# Patient Record
Sex: FEMALE | Race: WHITE | Employment: FULL TIME | URBAN - NONMETROPOLITAN AREA
[De-identification: names, ages, dates, MRNs, and addresses within clinical notes are randomized per-mention and may not be internally consistent; named-entity substitution may affect disease eponyms.]

---

## 2017-01-09 ENCOUNTER — OFFICE VISIT (OUTPATIENT)
Dept: SURGERY | Age: 42
End: 2017-01-09
Payer: COMMERCIAL

## 2017-01-09 VITALS
HEART RATE: 78 BPM | SYSTOLIC BLOOD PRESSURE: 100 MMHG | RESPIRATION RATE: 18 BRPM | HEIGHT: 67 IN | DIASTOLIC BLOOD PRESSURE: 68 MMHG | WEIGHT: 116.6 LBS | BODY MASS INDEX: 18.3 KG/M2

## 2017-01-09 DIAGNOSIS — R92.2 DENSE BREASTS: ICD-10-CM

## 2017-01-09 DIAGNOSIS — N60.19 DIFFUSE CYSTIC MASTOPATHY, UNSPECIFIED LATERALITY: ICD-10-CM

## 2017-01-09 DIAGNOSIS — N64.4 BREAST PAIN: Primary | ICD-10-CM

## 2017-01-09 PROCEDURE — 99214 OFFICE O/P EST MOD 30 MIN: CPT | Performed by: SURGERY

## 2017-01-09 RX ORDER — VENLAFAXINE HYDROCHLORIDE 75 MG/1
75 CAPSULE, EXTENDED RELEASE ORAL DAILY
Qty: 30 CAPSULE | Refills: 3 | Status: SHIPPED | OUTPATIENT
Start: 2017-01-09 | End: 2017-02-22

## 2017-01-24 ENCOUNTER — TELEPHONE (OUTPATIENT)
Dept: SURGERY | Age: 42
End: 2017-01-24

## 2017-02-20 ENCOUNTER — HOSPITAL ENCOUNTER (OUTPATIENT)
Dept: WOMENS IMAGING | Age: 42
Discharge: HOME | End: 2017-02-20
Payer: COMMERCIAL

## 2017-02-20 DIAGNOSIS — N64.4 BREAST PAIN: ICD-10-CM

## 2017-02-20 PROCEDURE — 76641 ULTRASOUND BREAST COMPLETE: CPT

## 2017-02-22 ENCOUNTER — OFFICE VISIT (OUTPATIENT)
Dept: SURGERY | Age: 42
End: 2017-02-22
Payer: COMMERCIAL

## 2017-02-22 VITALS
DIASTOLIC BLOOD PRESSURE: 60 MMHG | TEMPERATURE: 99.2 F | BODY MASS INDEX: 17.71 KG/M2 | SYSTOLIC BLOOD PRESSURE: 100 MMHG | WEIGHT: 112.8 LBS | HEIGHT: 67 IN

## 2017-02-22 DIAGNOSIS — N60.19 DIFFUSE CYSTIC MASTOPATHY, UNSPECIFIED LATERALITY: ICD-10-CM

## 2017-02-22 DIAGNOSIS — N64.4 BREAST PAIN: Primary | ICD-10-CM

## 2017-02-22 DIAGNOSIS — R92.2 DENSE BREASTS: ICD-10-CM

## 2017-02-22 PROCEDURE — 99214 OFFICE O/P EST MOD 30 MIN: CPT | Performed by: SURGERY

## 2017-02-22 RX ORDER — TAMOXIFEN CITRATE 20 MG/1
20 TABLET ORAL DAILY
Qty: 30 TABLET | Refills: 5 | Status: SHIPPED | OUTPATIENT
Start: 2017-02-22 | End: 2017-10-18 | Stop reason: ALTCHOICE

## 2017-02-22 RX ORDER — OSELTAMIVIR PHOSPHATE 75 MG/1
CAPSULE ORAL
COMMUNITY
Start: 2017-02-08 | End: 2017-10-18 | Stop reason: ALTCHOICE

## 2017-04-24 DIAGNOSIS — R92.2 DENSE BREASTS: ICD-10-CM

## 2017-04-24 DIAGNOSIS — N64.4 BREAST PAIN: ICD-10-CM

## 2017-04-24 DIAGNOSIS — N63.0 LUMP OR MASS IN BREAST: Primary | ICD-10-CM

## 2017-05-16 ENCOUNTER — HOSPITAL ENCOUNTER (OUTPATIENT)
Dept: WOMENS IMAGING | Age: 42
Discharge: HOME | End: 2017-05-16
Payer: COMMERCIAL

## 2017-05-16 DIAGNOSIS — N64.4 BREAST PAIN: ICD-10-CM

## 2017-05-16 DIAGNOSIS — R92.2 DENSE BREASTS: ICD-10-CM

## 2017-05-16 DIAGNOSIS — N63.0 LUMP OR MASS IN BREAST: ICD-10-CM

## 2017-05-16 PROCEDURE — 76642 ULTRASOUND BREAST LIMITED: CPT

## 2017-05-19 ENCOUNTER — OFFICE VISIT (OUTPATIENT)
Dept: SURGERY | Age: 42
End: 2017-05-19
Payer: COMMERCIAL

## 2017-05-19 VITALS
HEART RATE: 68 BPM | HEIGHT: 67 IN | WEIGHT: 111.6 LBS | DIASTOLIC BLOOD PRESSURE: 64 MMHG | SYSTOLIC BLOOD PRESSURE: 110 MMHG | BODY MASS INDEX: 17.52 KG/M2

## 2017-05-19 DIAGNOSIS — N60.19 DIFFUSE CYSTIC MASTOPATHY, UNSPECIFIED LATERALITY: ICD-10-CM

## 2017-05-19 DIAGNOSIS — R92.2 DENSE BREASTS: ICD-10-CM

## 2017-05-19 DIAGNOSIS — N64.4 BREAST PAIN: Primary | ICD-10-CM

## 2017-05-19 PROCEDURE — 99214 OFFICE O/P EST MOD 30 MIN: CPT | Performed by: SURGERY

## 2017-10-18 ENCOUNTER — OFFICE VISIT (OUTPATIENT)
Dept: SURGERY | Age: 42
End: 2017-10-18
Payer: COMMERCIAL

## 2017-10-18 VITALS
RESPIRATION RATE: 18 BRPM | DIASTOLIC BLOOD PRESSURE: 68 MMHG | SYSTOLIC BLOOD PRESSURE: 108 MMHG | HEIGHT: 67 IN | BODY MASS INDEX: 17.58 KG/M2 | WEIGHT: 112 LBS | HEART RATE: 84 BPM

## 2017-10-18 DIAGNOSIS — N64.4 BREAST PAIN: ICD-10-CM

## 2017-10-18 DIAGNOSIS — N60.19 FIBROCYSTIC BREAST DISEASE (FCBD), UNSPECIFIED LATERALITY: Primary | ICD-10-CM

## 2017-10-18 DIAGNOSIS — R92.2 DENSE BREASTS: ICD-10-CM

## 2017-10-18 PROCEDURE — G8419 CALC BMI OUT NRM PARAM NOF/U: HCPCS | Performed by: SURGERY

## 2017-10-18 PROCEDURE — G8484 FLU IMMUNIZE NO ADMIN: HCPCS | Performed by: SURGERY

## 2017-10-18 PROCEDURE — 99213 OFFICE O/P EST LOW 20 MIN: CPT | Performed by: SURGERY

## 2017-10-18 PROCEDURE — 4004F PT TOBACCO SCREEN RCVD TLK: CPT | Performed by: SURGERY

## 2017-10-18 PROCEDURE — G8427 DOCREV CUR MEDS BY ELIG CLIN: HCPCS | Performed by: SURGERY

## 2017-10-19 NOTE — PROGRESS NOTES
HISTORY OF PRESENT ILLNESS:      Ms. Samy Brito presents today for her follow up breast check. She is s/p benign bilateral breast biopsies on 07/15/16 were negative for malignancy. Her breast tenderness seems to improved minimally on the tamoxifen. She is having a few hot flashes and some irregular menses. She is ready to seriously discuss bilateral mastectomy and reconstruction. EXAMINATION:  On examination, she has fibrocystic changes throughout both breasts, no dominant masses, no skin or nipple changes and no axillary adenopathy. I see nothing suspicious for breast cancer. She does seem significantly less tenderness than on previous visits.      I have stressed the importance of self breast exam and have explained the technique to her. We also discussed the pathophysiology of fibrocystic disease and breast cancer. She expresses good understanding.      PLAN: Follow-up in November. She will work on stopping smoking. We will test for nicotine prior to scheduling her surgery. Given her breast size I suspect she would actually do best with direct to implant reconstruction. Absolutely she must stop smoking before we do anything. Over 50% of visit was spent counseling patient.   25 minutes of face to face time spent with patient.  Hussain Velazquez

## 2017-11-06 ENCOUNTER — TELEPHONE (OUTPATIENT)
Dept: SURGERY | Age: 42
End: 2017-11-06

## 2017-11-06 DIAGNOSIS — Z72.0 TOBACCO ABUSE: Primary | ICD-10-CM

## 2017-11-07 DIAGNOSIS — Z72.0 TOBACCO ABUSE: ICD-10-CM

## 2017-11-10 LAB
3-OH-COTININE: <2 NG/ML
COTININE: <2 NG/ML
NICOTINE: <2 NG/ML

## 2017-11-13 ENCOUNTER — HOSPITAL ENCOUNTER (OUTPATIENT)
Dept: PREADMISSION TESTING | Age: 42
Discharge: HOME | End: 2017-11-13
Payer: COMMERCIAL

## 2017-11-13 ENCOUNTER — OFFICE VISIT (OUTPATIENT)
Dept: SURGERY | Age: 42
End: 2017-11-13
Payer: COMMERCIAL

## 2017-11-13 ENCOUNTER — PREP FOR PROCEDURE (OUTPATIENT)
Dept: SURGERY | Age: 42
End: 2017-11-13

## 2017-11-13 VITALS
HEIGHT: 67 IN | DIASTOLIC BLOOD PRESSURE: 70 MMHG | WEIGHT: 112 LBS | HEART RATE: 64 BPM | SYSTOLIC BLOOD PRESSURE: 100 MMHG | BODY MASS INDEX: 17.58 KG/M2 | TEMPERATURE: 100 F

## 2017-11-13 VITALS — BODY MASS INDEX: 17.58 KG/M2 | WEIGHT: 112 LBS | HEIGHT: 67 IN

## 2017-11-13 DIAGNOSIS — N64.4 MASTODYNIA: Primary | ICD-10-CM

## 2017-11-13 LAB
BASOPHILS ABSOLUTE: 0 K/UL (ref 0–0.2)
BASOPHILS RELATIVE PERCENT: 0.3 % (ref 0–1)
EOSINOPHILS ABSOLUTE: 0 K/UL (ref 0–0.6)
EOSINOPHILS RELATIVE PERCENT: 0.5 % (ref 0–5)
HCT VFR BLD CALC: 40.5 % (ref 37–47)
HEMOGLOBIN: 13.6 G/DL (ref 12–16)
LYMPHOCYTES ABSOLUTE: 2.8 K/UL (ref 1.1–4.5)
LYMPHOCYTES RELATIVE PERCENT: 35.3 % (ref 20–40)
MCH RBC QN AUTO: 31.3 PG (ref 27–31)
MCHC RBC AUTO-ENTMCNC: 33.6 G/DL (ref 33–37)
MCV RBC AUTO: 93.3 FL (ref 81–99)
MONOCYTES ABSOLUTE: 0.6 K/UL (ref 0–0.9)
MONOCYTES RELATIVE PERCENT: 7.2 % (ref 0–10)
NEUTROPHILS ABSOLUTE: 4.4 K/UL (ref 1.5–7.5)
NEUTROPHILS RELATIVE PERCENT: 56.3 % (ref 50–65)
PDW BLD-RTO: 11.6 % (ref 11.5–14.5)
PLATELET # BLD: 250 K/UL (ref 130–400)
PMV BLD AUTO: 9.8 FL (ref 9.4–12.3)
RBC # BLD: 4.34 M/UL (ref 4.2–5.4)
WBC # BLD: 7.8 K/UL (ref 4.8–10.8)

## 2017-11-13 PROCEDURE — G8484 FLU IMMUNIZE NO ADMIN: HCPCS | Performed by: PHYSICIAN ASSISTANT

## 2017-11-13 PROCEDURE — G8419 CALC BMI OUT NRM PARAM NOF/U: HCPCS | Performed by: PHYSICIAN ASSISTANT

## 2017-11-13 PROCEDURE — G8427 DOCREV CUR MEDS BY ELIG CLIN: HCPCS | Performed by: PHYSICIAN ASSISTANT

## 2017-11-13 PROCEDURE — 99213 OFFICE O/P EST LOW 20 MIN: CPT | Performed by: PHYSICIAN ASSISTANT

## 2017-11-13 PROCEDURE — 87070 CULTURE OTHR SPECIMN AEROBIC: CPT

## 2017-11-13 PROCEDURE — 1036F TOBACCO NON-USER: CPT | Performed by: PHYSICIAN ASSISTANT

## 2017-11-13 PROCEDURE — 85025 COMPLETE CBC W/AUTO DIFF WBC: CPT

## 2017-11-13 RX ORDER — SODIUM CHLORIDE, SODIUM LACTATE, POTASSIUM CHLORIDE, CALCIUM CHLORIDE 600; 310; 30; 20 MG/100ML; MG/100ML; MG/100ML; MG/100ML
INJECTION, SOLUTION INTRAVENOUS CONTINUOUS
Status: CANCELLED | OUTPATIENT
Start: 2017-11-13

## 2017-11-13 RX ORDER — SODIUM CHLORIDE 0.9 % (FLUSH) 0.9 %
10 SYRINGE (ML) INJECTION PRN
Status: CANCELLED | OUTPATIENT
Start: 2017-11-13

## 2017-11-13 RX ORDER — CLINDAMYCIN PHOSPHATE 900 MG/50ML
900 INJECTION INTRAVENOUS
Status: CANCELLED | OUTPATIENT
Start: 2017-11-13 | End: 2017-11-13

## 2017-11-13 RX ORDER — SODIUM CHLORIDE 0.9 % (FLUSH) 0.9 %
10 SYRINGE (ML) INJECTION EVERY 12 HOURS SCHEDULED
Status: CANCELLED | OUTPATIENT
Start: 2017-11-13

## 2017-11-13 RX ORDER — BUPROPION HYDROCHLORIDE 150 MG/1
TABLET, EXTENDED RELEASE ORAL
Refills: 0 | COMMUNITY
Start: 2017-10-20 | End: 2017-11-13 | Stop reason: ALTCHOICE

## 2017-11-13 NOTE — PROGRESS NOTES
HISTORY OF PRESENT ILLNESS:  Cindy Beach is a 52-year-old patient of Dr. Henry Shone who presents with bilateral breast pain. She reports that the pain can be quite severe. She was seen by Dr. Henry Shone and placed on pharmacological treatment and also behavior modification techniques but has had persistent symptoms. She and Dr. Henry Shone have discussed bilateral mastectomies with immediate reconstructions. She is a smoker and has quit smoking recently. Her nicotine level was less than 2. I have discussed her care of Dr. Henry Shone and after discussion of the risks benefits and alternatives of surgery, she wishes to proceed with bilateral mastectomies with immediate reconstruction. Patient Active Problem List    Diagnosis Date Noted    Breast pain 05/06/2016    Dense breasts 05/06/2016    Lump or mass in breast 05/06/2016    Diffuse cystic mastopathy 05/06/2016     Current Outpatient Prescriptions   Medication Sig Dispense Refill    Ascorbic Acid (VITAMIN C) 500 MG tablet Take 500 mg by mouth daily      BL EVENING PRIMROSE OIL PO Take by mouth      Vitamin E 100 UNITS TABS Take by mouth      Flaxseed, Linseed, (FLAX SEEDS PO) Take by mouth      lidocaine (LIDODERM) 5 % Place 1 patch onto the skin every 24 hours 12 hours on, 12 hours off. 30 patch 5    acetaminophen (TYLENOL) 325 MG tablet Take 650 mg by mouth every 6 hours as needed for Pain      ibuprofen (ADVIL;MOTRIN) 200 MG tablet Take 800 mg by mouth every 6 hours as needed for Pain        No current facility-administered medications for this visit.       Allergies: Pcn [penicillins]     Past Medical History:   Diagnosis Date    Abnormal Pap smear of cervix     Dense breasts 5/6/2016    Irritable bowel syndrome      Past Surgical History:   Procedure Laterality Date    BLADDER REPAIR  stent    BREAST SURGERY  07/15/2016    Bilateral cyst aspiration & Biopsies    LEEP  1999    TUBAL LIGATION       Family History   Problem Relation Age of Onset    Stroke

## 2017-11-13 NOTE — LETTER
Preop Orders:     Patient:  Ferdinand Dewey  : 1975    Hospital:  [address] [facility]     Admitting Physician:  Lacey Lovell      Diagnosis:   Mastodynia    Procedure:   Bilateral mastectomy with immediate reconstruction    Anesthesia: General    Admission:Outpatient    Date:17    Labs:MRSA screen and per anesthesia      Pre-Op Meds:  Clindamycin 900 mg IV    Latex Allergy: no    Beta Blocker: no    Medication Instructions: No plavix for 2 weeks prior to procedure; no asprin for 3 days prior to procedure    This has been electronically signed by Yevgeniy Santos M.D.

## 2017-11-15 LAB — MRSA CULTURE ONLY: NORMAL

## 2017-11-16 ENCOUNTER — TELEPHONE (OUTPATIENT)
Dept: SURGERY | Age: 42
End: 2017-11-16

## 2017-11-21 ENCOUNTER — ANESTHESIA EVENT (OUTPATIENT)
Dept: OPERATING ROOM | Age: 42
End: 2017-11-21
Payer: COMMERCIAL

## 2017-11-21 ENCOUNTER — HOSPITAL ENCOUNTER (OUTPATIENT)
Age: 42
Setting detail: OBSERVATION
Discharge: HOME | End: 2017-11-22
Attending: SURGERY | Admitting: SURGERY
Payer: COMMERCIAL

## 2017-11-21 ENCOUNTER — ANESTHESIA (OUTPATIENT)
Dept: OPERATING ROOM | Age: 42
End: 2017-11-21
Payer: COMMERCIAL

## 2017-11-21 VITALS
RESPIRATION RATE: 1 BRPM | OXYGEN SATURATION: 99 % | DIASTOLIC BLOOD PRESSURE: 38 MMHG | SYSTOLIC BLOOD PRESSURE: 91 MMHG | TEMPERATURE: 98.8 F

## 2017-11-21 PROBLEM — Z90.13 S/P BILATERAL MASTECTOMY: Status: ACTIVE | Noted: 2017-11-21

## 2017-11-21 LAB — HCG(URINE) PREGNANCY TEST: NEGATIVE

## 2017-11-21 PROCEDURE — 15860 IV NJX TST VASC FLO FLAP/GRF: CPT | Performed by: SURGERY

## 2017-11-21 PROCEDURE — C9733 NON-OPHTHALMIC FVA: HCPCS | Performed by: SURGERY

## 2017-11-21 PROCEDURE — 2500000003 HC RX 250 WO HCPCS: Performed by: SURGERY

## 2017-11-21 PROCEDURE — 7100000001 HC PACU RECOVERY - ADDTL 15 MIN: Performed by: SURGERY

## 2017-11-21 PROCEDURE — G0378 HOSPITAL OBSERVATION PER HR: HCPCS

## 2017-11-21 PROCEDURE — 96365 THER/PROPH/DIAG IV INF INIT: CPT

## 2017-11-21 PROCEDURE — 81025 URINE PREGNANCY TEST: CPT

## 2017-11-21 PROCEDURE — 2720000001 HC MISC SURG SUPPLY STERILE $51-500: Performed by: SURGERY

## 2017-11-21 PROCEDURE — 3600000015 HC SURGERY LEVEL 5 ADDTL 15MIN: Performed by: SURGERY

## 2017-11-21 PROCEDURE — 6360000002 HC RX W HCPCS

## 2017-11-21 PROCEDURE — 88307 TISSUE EXAM BY PATHOLOGIST: CPT

## 2017-11-21 PROCEDURE — 19340 INSJ BREAST IMPLT SM D MAST: CPT | Performed by: SURGERY

## 2017-11-21 PROCEDURE — 6370000000 HC RX 637 (ALT 250 FOR IP): Performed by: SURGERY

## 2017-11-21 PROCEDURE — 2500000003 HC RX 250 WO HCPCS

## 2017-11-21 PROCEDURE — 6360000002 HC RX W HCPCS: Performed by: SURGERY

## 2017-11-21 PROCEDURE — 2580000003 HC RX 258: Performed by: SURGERY

## 2017-11-21 PROCEDURE — 3700000000 HC ANESTHESIA ATTENDED CARE: Performed by: SURGERY

## 2017-11-21 PROCEDURE — 19303 MAST SIMPLE COMPLETE: CPT | Performed by: SURGERY

## 2017-11-21 PROCEDURE — 2580000003 HC RX 258

## 2017-11-21 PROCEDURE — 19366 PR BREAST RECONSTRUC W OTHR TECHNIQ: CPT | Performed by: SURGERY

## 2017-11-21 PROCEDURE — 2580000003 HC RX 258: Performed by: PHYSICIAN ASSISTANT

## 2017-11-21 PROCEDURE — 15777 ACELLULAR DERM MATRIX IMPLT: CPT | Performed by: SURGERY

## 2017-11-21 PROCEDURE — 94664 DEMO&/EVAL PT USE INHALER: CPT

## 2017-11-21 PROCEDURE — 3600000005 HC SURGERY LEVEL 5 BASE: Performed by: SURGERY

## 2017-11-21 PROCEDURE — 2720000010 HC SURG SUPPLY STERILE: Performed by: SURGERY

## 2017-11-21 PROCEDURE — C1729 CATH, DRAINAGE: HCPCS | Performed by: SURGERY

## 2017-11-21 PROCEDURE — 2500000003 HC RX 250 WO HCPCS: Performed by: PHYSICIAN ASSISTANT

## 2017-11-21 PROCEDURE — A6403 STERILE GAUZE>16 <= 48 SQ IN: HCPCS | Performed by: SURGERY

## 2017-11-21 PROCEDURE — 3700000001 HC ADD 15 MINUTES (ANESTHESIA): Performed by: SURGERY

## 2017-11-21 PROCEDURE — 7100000000 HC PACU RECOVERY - FIRST 15 MIN: Performed by: SURGERY

## 2017-11-21 DEVICE — TISSUE ALLODERM SELECT 16X20CM: Type: IMPLANTABLE DEVICE | Site: BREAST | Status: FUNCTIONAL

## 2017-11-21 RX ORDER — SODIUM CHLORIDE 0.9 % (FLUSH) 0.9 %
10 SYRINGE (ML) INJECTION EVERY 12 HOURS SCHEDULED
Status: DISCONTINUED | OUTPATIENT
Start: 2017-11-21 | End: 2017-11-21 | Stop reason: HOSPADM

## 2017-11-21 RX ORDER — MEPERIDINE HYDROCHLORIDE 50 MG/ML
12.5 INJECTION INTRAMUSCULAR; INTRAVENOUS; SUBCUTANEOUS EVERY 5 MIN PRN
Status: DISCONTINUED | OUTPATIENT
Start: 2017-11-21 | End: 2017-11-21 | Stop reason: HOSPADM

## 2017-11-21 RX ORDER — ONDANSETRON 2 MG/ML
4 INJECTION INTRAMUSCULAR; INTRAVENOUS EVERY 6 HOURS PRN
Status: DISCONTINUED | OUTPATIENT
Start: 2017-11-21 | End: 2017-11-23 | Stop reason: HOSPADM

## 2017-11-21 RX ORDER — MIDAZOLAM HYDROCHLORIDE 1 MG/ML
2 INJECTION INTRAMUSCULAR; INTRAVENOUS
Status: DISCONTINUED | OUTPATIENT
Start: 2017-11-21 | End: 2017-11-21 | Stop reason: HOSPADM

## 2017-11-21 RX ORDER — GLYCOPYRROLATE 0.2 MG/ML
INJECTION INTRAMUSCULAR; INTRAVENOUS PRN
Status: DISCONTINUED | OUTPATIENT
Start: 2017-11-21 | End: 2017-11-21 | Stop reason: SDUPTHER

## 2017-11-21 RX ORDER — NALOXONE HYDROCHLORIDE 0.4 MG/ML
0.4 INJECTION, SOLUTION INTRAMUSCULAR; INTRAVENOUS; SUBCUTANEOUS PRN
Status: DISCONTINUED | OUTPATIENT
Start: 2017-11-21 | End: 2017-11-23 | Stop reason: HOSPADM

## 2017-11-21 RX ORDER — CLINDAMYCIN PHOSPHATE 900 MG/50ML
900 INJECTION INTRAVENOUS
Status: COMPLETED | OUTPATIENT
Start: 2017-11-21 | End: 2017-11-21

## 2017-11-21 RX ORDER — FENTANYL CITRATE 50 UG/ML
INJECTION, SOLUTION INTRAMUSCULAR; INTRAVENOUS PRN
Status: DISCONTINUED | OUTPATIENT
Start: 2017-11-21 | End: 2017-11-21 | Stop reason: SDUPTHER

## 2017-11-21 RX ORDER — EPHEDRINE SULFATE 50 MG/ML
INJECTION, SOLUTION INTRAVENOUS PRN
Status: DISCONTINUED | OUTPATIENT
Start: 2017-11-21 | End: 2017-11-21 | Stop reason: SDUPTHER

## 2017-11-21 RX ORDER — DIPHENHYDRAMINE HYDROCHLORIDE 50 MG/ML
12.5 INJECTION INTRAMUSCULAR; INTRAVENOUS
Status: DISCONTINUED | OUTPATIENT
Start: 2017-11-21 | End: 2017-11-21 | Stop reason: HOSPADM

## 2017-11-21 RX ORDER — HYDROCODONE BITARTRATE AND ACETAMINOPHEN 5; 325 MG/1; MG/1
2 TABLET ORAL EVERY 4 HOURS PRN
Status: DISCONTINUED | OUTPATIENT
Start: 2017-11-21 | End: 2017-11-23 | Stop reason: HOSPADM

## 2017-11-21 RX ORDER — HYDRALAZINE HYDROCHLORIDE 20 MG/ML
5 INJECTION INTRAMUSCULAR; INTRAVENOUS EVERY 10 MIN PRN
Status: DISCONTINUED | OUTPATIENT
Start: 2017-11-21 | End: 2017-11-21 | Stop reason: HOSPADM

## 2017-11-21 RX ORDER — MORPHINE SULFATE 10 MG/ML
4 INJECTION, SOLUTION INTRAMUSCULAR; INTRAVENOUS
Status: DISCONTINUED | OUTPATIENT
Start: 2017-11-21 | End: 2017-11-22

## 2017-11-21 RX ORDER — LIDOCAINE HYDROCHLORIDE 10 MG/ML
1 INJECTION, SOLUTION EPIDURAL; INFILTRATION; INTRACAUDAL; PERINEURAL
Status: DISCONTINUED | OUTPATIENT
Start: 2017-11-21 | End: 2017-11-21 | Stop reason: HOSPADM

## 2017-11-21 RX ORDER — LABETALOL HYDROCHLORIDE 5 MG/ML
5 INJECTION, SOLUTION INTRAVENOUS EVERY 10 MIN PRN
Status: DISCONTINUED | OUTPATIENT
Start: 2017-11-21 | End: 2017-11-21 | Stop reason: HOSPADM

## 2017-11-21 RX ORDER — DEXTROSE, SODIUM CHLORIDE, SODIUM LACTATE, POTASSIUM CHLORIDE, AND CALCIUM CHLORIDE 5; .6; .31; .03; .02 G/100ML; G/100ML; G/100ML; G/100ML; G/100ML
INJECTION, SOLUTION INTRAVENOUS CONTINUOUS
Status: DISCONTINUED | OUTPATIENT
Start: 2017-11-21 | End: 2017-11-23 | Stop reason: HOSPADM

## 2017-11-21 RX ORDER — SODIUM CHLORIDE 0.9 % (FLUSH) 0.9 %
10 SYRINGE (ML) INJECTION PRN
Status: DISCONTINUED | OUTPATIENT
Start: 2017-11-21 | End: 2017-11-21 | Stop reason: HOSPADM

## 2017-11-21 RX ORDER — ENALAPRILAT 2.5 MG/2ML
1.25 INJECTION INTRAVENOUS
Status: DISCONTINUED | OUTPATIENT
Start: 2017-11-21 | End: 2017-11-21 | Stop reason: HOSPADM

## 2017-11-21 RX ORDER — SODIUM CHLORIDE, SODIUM LACTATE, POTASSIUM CHLORIDE, CALCIUM CHLORIDE 600; 310; 30; 20 MG/100ML; MG/100ML; MG/100ML; MG/100ML
INJECTION, SOLUTION INTRAVENOUS CONTINUOUS
Status: DISCONTINUED | OUTPATIENT
Start: 2017-11-21 | End: 2017-11-21

## 2017-11-21 RX ORDER — PROPOFOL 10 MG/ML
INJECTION, EMULSION INTRAVENOUS PRN
Status: DISCONTINUED | OUTPATIENT
Start: 2017-11-21 | End: 2017-11-21 | Stop reason: SDUPTHER

## 2017-11-21 RX ORDER — SODIUM CHLORIDE 0.9 % (FLUSH) 0.9 %
10 SYRINGE (ML) INJECTION PRN
Status: DISCONTINUED | OUTPATIENT
Start: 2017-11-21 | End: 2017-11-23 | Stop reason: HOSPADM

## 2017-11-21 RX ORDER — FENTANYL CITRATE 50 UG/ML
50 INJECTION, SOLUTION INTRAMUSCULAR; INTRAVENOUS
Status: DISCONTINUED | OUTPATIENT
Start: 2017-11-21 | End: 2017-11-21 | Stop reason: HOSPADM

## 2017-11-21 RX ORDER — ROCURONIUM BROMIDE 10 MG/ML
INJECTION, SOLUTION INTRAVENOUS PRN
Status: DISCONTINUED | OUTPATIENT
Start: 2017-11-21 | End: 2017-11-21 | Stop reason: SDUPTHER

## 2017-11-21 RX ORDER — SUCCINYLCHOLINE CHLORIDE 20 MG/ML
INJECTION INTRAMUSCULAR; INTRAVENOUS PRN
Status: DISCONTINUED | OUTPATIENT
Start: 2017-11-21 | End: 2017-11-21 | Stop reason: SDUPTHER

## 2017-11-21 RX ORDER — CLINDAMYCIN PHOSPHATE 900 MG/50ML
900 INJECTION INTRAVENOUS EVERY 8 HOURS
Status: COMPLETED | OUTPATIENT
Start: 2017-11-22 | End: 2017-11-22

## 2017-11-21 RX ORDER — MORPHINE SULFATE 10 MG/ML
4 INJECTION, SOLUTION INTRAMUSCULAR; INTRAVENOUS EVERY 5 MIN PRN
Status: DISCONTINUED | OUTPATIENT
Start: 2017-11-21 | End: 2017-11-21 | Stop reason: HOSPADM

## 2017-11-21 RX ORDER — MORPHINE SULFATE 10 MG/ML
2 INJECTION, SOLUTION INTRAMUSCULAR; INTRAVENOUS EVERY 5 MIN PRN
Status: DISCONTINUED | OUTPATIENT
Start: 2017-11-21 | End: 2017-11-21 | Stop reason: HOSPADM

## 2017-11-21 RX ORDER — ONDANSETRON 2 MG/ML
INJECTION INTRAMUSCULAR; INTRAVENOUS PRN
Status: DISCONTINUED | OUTPATIENT
Start: 2017-11-21 | End: 2017-11-21 | Stop reason: SDUPTHER

## 2017-11-21 RX ORDER — MORPHINE SULFATE 1 MG/ML
4 INJECTION, SOLUTION EPIDURAL; INTRATHECAL; INTRAVENOUS
Status: DISCONTINUED | OUTPATIENT
Start: 2017-11-21 | End: 2017-11-21 | Stop reason: HOSPADM

## 2017-11-21 RX ORDER — HYDROMORPHONE HCL IN 0.9% NACL 10 MG/50ML
PATIENT CONTROLLED ANALGESIA SYRINGE INTRAVENOUS CONTINUOUS
Status: DISCONTINUED | OUTPATIENT
Start: 2017-11-21 | End: 2017-11-23 | Stop reason: HOSPADM

## 2017-11-21 RX ORDER — MIDAZOLAM HYDROCHLORIDE 1 MG/ML
INJECTION INTRAMUSCULAR; INTRAVENOUS PRN
Status: DISCONTINUED | OUTPATIENT
Start: 2017-11-21 | End: 2017-11-21 | Stop reason: SDUPTHER

## 2017-11-21 RX ORDER — SODIUM CHLORIDE, SODIUM GLUCONATE, SODIUM ACETATE, POTASSIUM CHLORIDE AND MAGNESIUM CHLORIDE 526; 502; 368; 37; 30 MG/100ML; MG/100ML; MG/100ML; MG/100ML; MG/100ML
INJECTION, SOLUTION INTRAVENOUS CONTINUOUS PRN
Status: DISCONTINUED | OUTPATIENT
Start: 2017-11-21 | End: 2017-11-21 | Stop reason: SDUPTHER

## 2017-11-21 RX ORDER — PROMETHAZINE HYDROCHLORIDE 25 MG/ML
6.25 INJECTION, SOLUTION INTRAMUSCULAR; INTRAVENOUS
Status: DISCONTINUED | OUTPATIENT
Start: 2017-11-21 | End: 2017-11-21 | Stop reason: HOSPADM

## 2017-11-21 RX ORDER — HYDROCODONE BITARTRATE AND ACETAMINOPHEN 5; 325 MG/1; MG/1
1 TABLET ORAL EVERY 4 HOURS PRN
Status: DISCONTINUED | OUTPATIENT
Start: 2017-11-21 | End: 2017-11-23 | Stop reason: HOSPADM

## 2017-11-21 RX ORDER — SODIUM CHLORIDE 0.9 % (FLUSH) 0.9 %
10 SYRINGE (ML) INJECTION EVERY 12 HOURS SCHEDULED
Status: DISCONTINUED | OUTPATIENT
Start: 2017-11-21 | End: 2017-11-23 | Stop reason: HOSPADM

## 2017-11-21 RX ORDER — LIDOCAINE HYDROCHLORIDE 10 MG/ML
1 INJECTION, SOLUTION EPIDURAL; INFILTRATION; INTRACAUDAL; PERINEURAL ONCE
Status: COMPLETED | OUTPATIENT
Start: 2017-11-21 | End: 2017-11-21

## 2017-11-21 RX ORDER — ACETAMINOPHEN 325 MG/1
650 TABLET ORAL EVERY 4 HOURS PRN
Status: DISCONTINUED | OUTPATIENT
Start: 2017-11-21 | End: 2017-11-23 | Stop reason: HOSPADM

## 2017-11-21 RX ORDER — SCOLOPAMINE TRANSDERMAL SYSTEM 1 MG/1
1 PATCH, EXTENDED RELEASE TRANSDERMAL ONCE
Status: DISCONTINUED | OUTPATIENT
Start: 2017-11-21 | End: 2017-11-21 | Stop reason: HOSPADM

## 2017-11-21 RX ORDER — LIDOCAINE HYDROCHLORIDE 10 MG/ML
INJECTION, SOLUTION EPIDURAL; INFILTRATION; INTRACAUDAL; PERINEURAL PRN
Status: DISCONTINUED | OUTPATIENT
Start: 2017-11-21 | End: 2017-11-21 | Stop reason: SDUPTHER

## 2017-11-21 RX ORDER — MORPHINE SULFATE 10 MG/ML
2 INJECTION, SOLUTION INTRAMUSCULAR; INTRAVENOUS
Status: DISCONTINUED | OUTPATIENT
Start: 2017-11-21 | End: 2017-11-22

## 2017-11-21 RX ORDER — DEXAMETHASONE SODIUM PHOSPHATE 10 MG/ML
INJECTION INTRAMUSCULAR; INTRAVENOUS PRN
Status: DISCONTINUED | OUTPATIENT
Start: 2017-11-21 | End: 2017-11-21 | Stop reason: SDUPTHER

## 2017-11-21 RX ORDER — METOCLOPRAMIDE HYDROCHLORIDE 5 MG/ML
10 INJECTION INTRAMUSCULAR; INTRAVENOUS
Status: DISCONTINUED | OUTPATIENT
Start: 2017-11-21 | End: 2017-11-21 | Stop reason: HOSPADM

## 2017-11-21 RX ADMIN — PROPOFOL 160 MG: 10 INJECTION, EMULSION INTRAVENOUS at 15:34

## 2017-11-21 RX ADMIN — CEFAZOLIN SODIUM 2 G: 2 SOLUTION INTRAVENOUS at 22:55

## 2017-11-21 RX ADMIN — SODIUM CHLORIDE, SODIUM LACTATE, POTASSIUM CHLORIDE, AND CALCIUM CHLORIDE: 600; 310; 30; 20 INJECTION, SOLUTION INTRAVENOUS at 11:56

## 2017-11-21 RX ADMIN — SODIUM CHLORIDE, SODIUM GLUCONATE, SODIUM ACETATE, POTASSIUM CHLORIDE AND MAGNESIUM CHLORIDE: 526; 502; 368; 37; 30 INJECTION, SOLUTION INTRAVENOUS at 19:44

## 2017-11-21 RX ADMIN — FENTANYL CITRATE 50 MCG: 50 INJECTION, SOLUTION INTRAMUSCULAR; INTRAVENOUS at 17:36

## 2017-11-21 RX ADMIN — DEXAMETHASONE SODIUM PHOSPHATE 10 MG: 10 INJECTION INTRAMUSCULAR; INTRAVENOUS at 15:40

## 2017-11-21 RX ADMIN — FENTANYL CITRATE 50 MCG: 50 INJECTION, SOLUTION INTRAMUSCULAR; INTRAVENOUS at 19:30

## 2017-11-21 RX ADMIN — MIDAZOLAM HYDROCHLORIDE 2 MG: 1 INJECTION, SOLUTION INTRAMUSCULAR; INTRAVENOUS at 15:20

## 2017-11-21 RX ADMIN — GLYCOPYRROLATE 0.2 MG: 0.2 INJECTION, SOLUTION INTRAMUSCULAR; INTRAVENOUS at 19:10

## 2017-11-21 RX ADMIN — HYDROMORPHONE HYDROCHLORIDE 0.5 MG: 1 INJECTION, SOLUTION INTRAMUSCULAR; INTRAVENOUS; SUBCUTANEOUS at 19:01

## 2017-11-21 RX ADMIN — HYDROMORPHONE HYDROCHLORIDE 0.5 MG: 1 INJECTION, SOLUTION INTRAMUSCULAR; INTRAVENOUS; SUBCUTANEOUS at 18:30

## 2017-11-21 RX ADMIN — HYDROMORPHONE HYDROCHLORIDE 0.5 MG: 1 INJECTION, SOLUTION INTRAMUSCULAR; INTRAVENOUS; SUBCUTANEOUS at 19:40

## 2017-11-21 RX ADMIN — ROCURONIUM BROMIDE 30 MG: 10 INJECTION INTRAVENOUS at 18:02

## 2017-11-21 RX ADMIN — FENTANYL CITRATE 50 MCG: 50 INJECTION, SOLUTION INTRAMUSCULAR; INTRAVENOUS at 15:57

## 2017-11-21 RX ADMIN — SUCCINYLCHOLINE CHLORIDE 140 MG: 20 INJECTION, SOLUTION INTRAMUSCULAR; INTRAVENOUS; PARENTERAL at 15:34

## 2017-11-21 RX ADMIN — ONDANSETRON HYDROCHLORIDE 4 MG: 2 SOLUTION INTRAMUSCULAR; INTRAVENOUS at 18:47

## 2017-11-21 RX ADMIN — CLINDAMYCIN PHOSPHATE 900 MG: 900 INJECTION INTRAVENOUS at 15:45

## 2017-11-21 RX ADMIN — Medication 10 MG: at 22:55

## 2017-11-21 RX ADMIN — ROCURONIUM BROMIDE 50 MG: 10 INJECTION INTRAVENOUS at 15:44

## 2017-11-21 RX ADMIN — ONDANSETRON 4 MG: 2 INJECTION INTRAMUSCULAR; INTRAVENOUS at 21:40

## 2017-11-21 RX ADMIN — LIDOCAINE HYDROCHLORIDE 50 MG: 10 INJECTION, SOLUTION EPIDURAL; INFILTRATION; INTRACAUDAL; PERINEURAL at 15:34

## 2017-11-21 RX ADMIN — NEOSTIGMINE METHYLSULFATE 3 MG: 1 INJECTION, SOLUTION INTRAMUSCULAR; INTRAVENOUS; SUBCUTANEOUS at 19:07

## 2017-11-21 RX ADMIN — ROCURONIUM BROMIDE 40 MG: 10 INJECTION INTRAVENOUS at 16:36

## 2017-11-21 RX ADMIN — EPHEDRINE SULFATE 10 MG: 50 INJECTION, SOLUTION INTRAMUSCULAR; INTRAVENOUS; SUBCUTANEOUS at 15:45

## 2017-11-21 RX ADMIN — SODIUM CHLORIDE, SODIUM LACTATE, POTASSIUM CHLORIDE, CALCIUM CHLORIDE AND DEXTROSE MONOHYDRATE: 5; 600; 310; 30; 20 INJECTION, SOLUTION INTRAVENOUS at 21:27

## 2017-11-21 RX ADMIN — SODIUM CHLORIDE, SODIUM LACTATE, POTASSIUM CHLORIDE, AND CALCIUM CHLORIDE: 600; 310; 30; 20 INJECTION, SOLUTION INTRAVENOUS at 15:40

## 2017-11-21 RX ADMIN — HYDROMORPHONE HYDROCHLORIDE 0.5 MG: 1 INJECTION, SOLUTION INTRAMUSCULAR; INTRAVENOUS; SUBCUTANEOUS at 20:24

## 2017-11-21 RX ADMIN — FENTANYL CITRATE 100 MCG: 50 INJECTION, SOLUTION INTRAMUSCULAR; INTRAVENOUS at 15:25

## 2017-11-21 RX ADMIN — LIDOCAINE HYDROCHLORIDE 1 ML: 10 INJECTION, SOLUTION EPIDURAL; INFILTRATION; INTRACAUDAL; PERINEURAL at 11:57

## 2017-11-21 ASSESSMENT — PAIN SCALES - GENERAL: PAINLEVEL_OUTOF10: 8

## 2017-11-21 ASSESSMENT — PAIN - FUNCTIONAL ASSESSMENT: PAIN_FUNCTIONAL_ASSESSMENT: 0-10

## 2017-11-21 ASSESSMENT — LIFESTYLE VARIABLES: SMOKING_STATUS: 0

## 2017-11-21 NOTE — ANESTHESIA PRE PROCEDURE
Department of Anesthesiology  Preprocedure Note       Name:  Geovany Farrell   Age:  40 y.o.  :  1975                                          MRN:  [id number]         Date:  2017      Surgeon: Alba Melara):  Carol Campos MD    Procedure: Procedure(s):  BREAST MASTECTOMY BILATERAL WITH IMMEDIATE RECONSTRUCTION    Medications prior to admission:   Prior to Admission medications    Medication Sig Start Date End Date Taking? Authorizing Provider   Ascorbic Acid (VITAMIN C) 500 MG tablet Take 500 mg by mouth daily   Yes Historical Provider, MD   BL EVENING PRIMROSE OIL PO Take 1 capsule by mouth daily    Yes Historical Provider, MD   Vitamin E 100 UNITS TABS Take by mouth   Yes Historical Provider, MD   lidocaine (LIDODERM) 5 % Place 1 patch onto the skin every 24 hours 12 hours on, 12 hours off. 16  Yes Carol Campos MD   acetaminophen (TYLENOL) 325 MG tablet Take 650 mg by mouth every 6 hours as needed for Pain   Yes Historical Provider, MD   ibuprofen (ADVIL;MOTRIN) 200 MG tablet Take 800 mg by mouth every 6 hours as needed for Pain    Yes Historical Provider, MD   Flaxseed, Linseed, (FLAX SEEDS PO) Take 1 capsule by mouth daily     Historical Provider, MD       Current medications:    Current Facility-Administered Medications   Medication Dose Route Frequency Provider Last Rate Last Dose    clindamycin (CLEOCIN) 900 mg in dextrose 5 % 50 mL IVPB  900 mg Intravenous On Call to 800 Sergey Waggoner PA-C        lactated ringers infusion   Intravenous Continuous Dayan Vicente PA-C 125 mL/hr at 17 1156      sodium chloride flush 0.9 % injection 10 mL  10 mL Intravenous 2 times per day Dayan Vicente PA-C        sodium chloride flush 0.9 % injection 10 mL  10 mL Intravenous PRN Dayan Vicente PA-C           Allergies:     Allergies   Allergen Reactions    Pcn [Penicillins] Rash     Childhood allergy       Problem List:    Patient Active Problem List   Diagnosis Code    Breast pain N64.4 Das Kevin in the last 72 hours. Coags: No results found for: PROTIME, INR, APTT    HCG (If Applicable):   Lab Results   Component Value Date    PREGTESTUR Negative 11/21/2017        ABGs: No results found for: PHART, PO2ART, USU9FGB, UAO5MSZ, BEART, Q8HJLBGB     Type & Screen (If Applicable):  No results found for: LABABO, [address]    Anesthesia Evaluation  Patient summary reviewed and Nursing notes reviewed no history of anesthetic complications:   Airway: Mallampati: II  TM distance: >3 FB   Neck ROM: full  Mouth opening: > = 3 FB Dental: normal exam         Pulmonary:normal exam        (-) not a current smoker          Patient did not smoke on day of surgery. ROS comment: Quit tob 3 days ago   Cardiovascular:          ECG reviewed               Beta Blocker:  Not on Beta Blocker         Neuro/Psych:   Negative Neuro/Psych ROS              GI/Hepatic/Renal:        (-) GERD       Endo/Other:        (-) hypothyroidism, no Type II DM               Abdominal:           Vascular: negative vascular ROS. Anesthesia Plan      general     ASA 2       Induction: intravenous. MIPS: Postoperative opioids intended and Prophylactic antiemetics administered. Anesthetic plan and risks discussed with patient. Plan discussed with CRNA.                   Franck Hollis MD   11/21/2017

## 2017-11-22 VITALS
DIASTOLIC BLOOD PRESSURE: 65 MMHG | HEART RATE: 81 BPM | SYSTOLIC BLOOD PRESSURE: 115 MMHG | BODY MASS INDEX: 17.58 KG/M2 | WEIGHT: 112 LBS | OXYGEN SATURATION: 100 % | TEMPERATURE: 98.4 F | RESPIRATION RATE: 16 BRPM | HEIGHT: 67 IN

## 2017-11-22 PROCEDURE — 96367 TX/PROPH/DG ADDL SEQ IV INF: CPT

## 2017-11-22 PROCEDURE — G0378 HOSPITAL OBSERVATION PER HR: HCPCS

## 2017-11-22 PROCEDURE — 2500000003 HC RX 250 WO HCPCS: Performed by: SURGERY

## 2017-11-22 PROCEDURE — 96366 THER/PROPH/DIAG IV INF ADDON: CPT

## 2017-11-22 PROCEDURE — 6370000000 HC RX 637 (ALT 250 FOR IP): Performed by: SURGERY

## 2017-11-22 PROCEDURE — 96376 TX/PRO/DX INJ SAME DRUG ADON: CPT

## 2017-11-22 PROCEDURE — 99024 POSTOP FOLLOW-UP VISIT: CPT | Performed by: SURGERY

## 2017-11-22 PROCEDURE — 6360000002 HC RX W HCPCS: Performed by: SURGERY

## 2017-11-22 RX ORDER — ONDANSETRON 4 MG/1
4 TABLET, FILM COATED ORAL EVERY 6 HOURS PRN
Qty: 10 TABLET | Refills: 2 | Status: SHIPPED | OUTPATIENT
Start: 2017-11-22 | End: 2021-10-21 | Stop reason: ALTCHOICE

## 2017-11-22 RX ORDER — SULFAMETHOXAZOLE AND TRIMETHOPRIM 800; 160 MG/1; MG/1
1 TABLET ORAL 2 TIMES DAILY
Qty: 20 TABLET | Refills: 1 | Status: SHIPPED | OUTPATIENT
Start: 2017-11-22 | End: 2017-11-25 | Stop reason: SINTOL

## 2017-11-22 RX ORDER — HYDROCODONE BITARTRATE AND ACETAMINOPHEN 5; 325 MG/1; MG/1
TABLET ORAL
Qty: 45 TABLET | Refills: 0 | Status: SHIPPED | OUTPATIENT
Start: 2017-11-22 | End: 2017-12-01 | Stop reason: SDUPTHER

## 2017-11-22 RX ORDER — MORPHINE SULFATE 1 MG/ML
2 INJECTION, SOLUTION EPIDURAL; INTRATHECAL; INTRAVENOUS
Status: DISCONTINUED | OUTPATIENT
Start: 2017-11-22 | End: 2017-11-23 | Stop reason: HOSPADM

## 2017-11-22 RX ORDER — MORPHINE SULFATE 1 MG/ML
4 INJECTION, SOLUTION EPIDURAL; INTRATHECAL; INTRAVENOUS
Status: DISCONTINUED | OUTPATIENT
Start: 2017-11-22 | End: 2017-11-23 | Stop reason: HOSPADM

## 2017-11-22 RX ADMIN — CLINDAMYCIN PHOSPHATE 900 MG: 18 INJECTION, SOLUTION INTRAVENOUS at 01:04

## 2017-11-22 RX ADMIN — HYDROCODONE BITARTRATE AND ACETAMINOPHEN 2 TABLET: 5; 325 TABLET ORAL at 19:26

## 2017-11-22 RX ADMIN — ONDANSETRON 4 MG: 2 INJECTION INTRAMUSCULAR; INTRAVENOUS at 14:34

## 2017-11-22 RX ADMIN — CLINDAMYCIN PHOSPHATE 900 MG: 18 INJECTION, SOLUTION INTRAVENOUS at 08:36

## 2017-11-22 RX ADMIN — ENOXAPARIN SODIUM 40 MG: 40 INJECTION SUBCUTANEOUS at 08:36

## 2017-11-22 RX ADMIN — MORPHINE SULFATE 2 MG: 10 INJECTION, SOLUTION INTRAMUSCULAR; INTRAVENOUS at 07:30

## 2017-11-22 RX ADMIN — CEFAZOLIN SODIUM 2 G: 2 SOLUTION INTRAVENOUS at 06:33

## 2017-11-22 RX ADMIN — ACETAMINOPHEN 650 MG: 325 TABLET ORAL at 14:31

## 2017-11-22 ASSESSMENT — PAIN SCALES - GENERAL
PAINLEVEL_OUTOF10: 5
PAINLEVEL_OUTOF10: 10
PAINLEVEL_OUTOF10: 4
PAINLEVEL_OUTOF10: 4
PAINLEVEL_OUTOF10: 3

## 2017-11-22 NOTE — ANESTHESIA POSTPROCEDURE EVALUATION
Department of Anesthesiology  Postprocedure Note    Patient: Viridiana Montes  MRN: [id number]  YOB: 1975  Date of evaluation: 11/21/2017  Time:  8:25 PM     Procedure Summary     Date:  11/21/17 Room / Location:  [facility] OR  / [facility] OR    Anesthesia Start:  1528 Anesthesia Stop:  2025    Procedure:  BREAST MASTECTOMY BILATERAL WITH IMMEDIATE RECONSTRUCTION (N/A Breast) Diagnosis:  (Mastodynia)    Surgeon:  Ama Ba MD Responsible Provider:  Maame Chi CRNA    Anesthesia Type:  general ASA Status:  2          Anesthesia Type: general    Lora Phase I:      Lora Phase II:      Last vitals: Reviewed and per EMR flowsheets.        Anesthesia Post Evaluation

## 2017-11-22 NOTE — OP NOTE
[facility] [address] [facility]                                 OPERATIVE REPORT    PATIENT NAME: Shelli Merinor                    :        1975  MED REC NO:   [id number]                              ROOM:       [facility]  ACCOUNT NO:   [de-identified]                           ADMIT DATE: 2017  PROVIDER:     Yevgeniy Santos MD      DATE OF PROCEDURE:  2017    PREOPERATIVE DIAGNOSIS:  Severe chronic mastodynia. POSTOPERATIVE DIAGNOSIS:  Severe chronic mastodynia. OPERATION PERFORMED:  1.  Bilateral nipple-sparing mastectomies. 2.  Bilateral direct implant reconstruction with 190 mL implants and  placement of acellular dermal matrix. 3.  Utilization of SPY Elite for assessment of tissue flap perfusion. SURGEON:  Yevgeniy Santos MD    ASSISTANT:  Dante Castellano PA-C, who was present for all portions of the  case including all critical portions as well as closure. ANESTHESIA:  General.    INDICATIONS:  The patient is a 78-year-old lady with 2-3 year history of  severe ongoing breast pain. We have tried a multitude of conservative  measures with minimal success and she would like to proceed with bilateral  mastectomy and reconstruction. We have been discussing this in some length  for over a year and we are now ready to proceed. We discussed the risks  and benefits at some length. She understands and is agreeable. OPERATIVE PROCEDURE:  Today, she was brought to the operating room,  underwent adequate general anesthesia, was prepped and draped in the  sterile fashion. We began on the right, made an inframammary incision and  dissected down to the muscle and then began taking the skin off the breast  parenchyma using facelift scissors, the PEAK PlasmaBlade, as well as the  LigaSure device.   Once we had completed this, we turned back to the  underside and from below, carefully dissected the breast off of the  pectoralis major muscle. Once this was accomplished, we removed the breast  to obtain good hemostasis, irrigated copiously. We marked the axilla and  the nipple, the axilla with a black suture and the nipple with a white  suture. We did take an extra sample of tissue from beneath the right  nipple. We then repeated the procedure in identical fashion on the left  side. Once we had done this, we irrigated copiously, obtained good  hemostasis, made sure everything was in good order. We then began our  reconstruction on the right side. We utilized a 16 x 20 piece of  perforated AlloDerm lot number [id number] and reference number [id number], we  kept the corners of the superior aspect and sutured in place with 2-0 PDS  suture. This was done in sort of a parachuting fashion. We then used a  running PDS around the medial aspect and lateral aspects. We measured our  breast size and it was 120 gm on the right and 115 on the left. We initially placed 140 mL sizers, which seemed to do quite well. We then  sutured the AlloDerm around the sizers all the way around and then did the  same process on the left, again parachuted the 16 x 20 AlloDerm Select  perforated mesh in place as well, sutured in place with 2-0 PDS. Once  again the 140 mL sizer, it appeared that this actually was going to be a  little small though as we _____ place it, so we then tried 190 mL sizer and  it seemed fit in there much more nicely. We then changed gowns and gloves,  re-draped around the incision and placed our implants. The implants were  highly cohesive anatomically shaped silicone gel implant 247 mL, serial  number [id number] the left breast and serial number [id number] the right  breast.  These were positioned appropriately and then the inferior aspect  of the AlloDerm was tacked into place, tucking the excess up under the  implants. We irrigated copiously with Betadine before we placed the final  implants.   We irrigated

## 2017-11-22 NOTE — BRIEF OP NOTE
Brief Postoperative Note      DATE OF PROCEDURE: 11/21/2017     SURGEON: Jossie Navarrete MD    PREOPERATIVE DIAGNOSIS:  Mastodynia    POSTOPERATIVE DIAGNOSIS: Same     OPERATION: Procedure(s):  BREAST MASTECTOMY BILATERAL WITH IMMEDIATE RECONSTRUCTION    ANESTHESIA: General    ESTIMATED BLOOD LOSS: Minimal    COMPLICATIONS: None. SPECIMENS:   ID Type Source Tests Collected by Time Destination   A : right nipple Tissue Breast SURGICAL PATHOLOGY Jossie Navarrete MD 11/21/2017 1608    B : left nipple Tissue Breast SURGICAL PATHOLOGY Jossie Navarrete MD 11/21/2017 1608    C : right breast tissue Tissue Breast SURGICAL PATHOLOGY Jossie Navarrete MD 11/21/2017 1621    D : left breast tissue Tissue Breast SURGICAL PATHOLOGY Jossie Navarrete MD 11/21/2017 1621        DRAINS: 4 CARLOS's    The patient tolerated the procedure well.     Electronically signed by Jossie Navarrete MD  on 11/21/2017 at 7:05 PM

## 2017-11-22 NOTE — PROGRESS NOTES
Byron Daniel M.D. FACS  Daily Progress Note    Pt Name: [id number] Wetzel Record Number: [id number]  Date of Birth 1975   Today's Date: 11/22/2017    Chief Complaint:  No chief complaint on file. SUBJECTIVE:     Marcelle Jimenez is doing well. Pain is well controlled. Wound vac didn't work very well.   We will reapply tegaderms    MEDS:     Scheduled Meds:   sodium chloride flush  10 mL Intravenous 2 times per day    enoxaparin  40 mg Subcutaneous Daily     Continuous Infusions:   dextrose 5% in lactated ringers 125 mL/hr at 11/21/17 2127    HYDROmorphone       PRN Meds:  sodium chloride flush 10 mL PRN   acetaminophen 650 mg Q4H PRN   HYDROcodone 5 mg - acetaminophen 1 tablet Q4H PRN   Or     HYDROcodone 5 mg - acetaminophen 2 tablet Q4H PRN   morphine 2 mg Q2H PRN   Or     morphine 4 mg Q2H PRN   ondansetron 4 mg Q6H PRN   naloxone 0.4 mg PRN       OBJECTIVE:     Patient Vitals for the past 24 hrs:   BP Temp Temp src Pulse Resp SpO2 Height Weight   11/22/17 1026 113/68 99 °F (37.2 °C) Temporal 69 16 97 % - -   11/22/17 0643 121/71 98.8 °F (37.1 °C) Temporal 66 16 99 % - -   11/22/17 0318 (!) 98/54 98 °F (36.7 °C) Temporal 84 16 98 % - -   11/21/17 2309 (!) 100/52 99.2 °F (37.3 °C) Temporal 89 16 98 % - -   11/21/17 2234 (!) 98/49 97.6 °F (36.4 °C) Temporal 83 14 96 % - -   11/21/17 2142 (!) 97/51 97.5 °F (36.4 °C) Temporal 76 14 92 % - -   11/21/17 2115 (!) 92/51 97.5 °F (36.4 °C) Temporal 87 14 92 % - -   11/21/17 2102 - - - 92 - - - -   11/21/17 2100 (!) 98/49 - - 90 14 93 % - -   11/21/17 2055 (!) 90/42 - - 103 13 94 % - -   11/21/17 2050 (!) 81/39 - - 83 11 94 % - -   11/21/17 2045 (!) 83/38 - - 84 12 93 % - -   11/21/17 2040 (!) 82/40 - - 78 11 94 % - -   11/21/17 2035 (!) 82/41 - - 83 13 95 % - -   11/21/17 2030 (!) 92/46 - - 72 11 98 % - -   11/21/17 2025 (!) 91/49 99 °F (37.2 °C) Temporal 71 10 99 % - -   11/21/17 1144 106/62 99.2 °F (37.3 °C) Tympanic 83 14 95 % 5' 7\" (1.702

## 2017-11-23 NOTE — PROGRESS NOTES
Patients IV dc'd from right hand and dsg applied.  PCA dc'd due to patient being discharged, 14ml used from syringe, 36ml wasted in omnicell but was wasted as Morphine instead of Dilaudid, waste was witnessed Electronically signed by Alma Merchant RN on 11/22/2017 at 10:03 PM

## 2017-11-25 RX ORDER — CLINDAMYCIN HYDROCHLORIDE 150 MG/1
150 CAPSULE ORAL 4 TIMES DAILY
Qty: 28 CAPSULE | Refills: 0 | Status: SHIPPED | OUTPATIENT
Start: 2017-11-25 | End: 2017-12-02

## 2017-11-28 ENCOUNTER — OFFICE VISIT (OUTPATIENT)
Dept: SURGERY | Age: 42
End: 2017-11-28

## 2017-11-28 VITALS — HEART RATE: 76 BPM | DIASTOLIC BLOOD PRESSURE: 80 MMHG | SYSTOLIC BLOOD PRESSURE: 110 MMHG

## 2017-11-28 DIAGNOSIS — Z90.13 S/P BILATERAL MASTECTOMY: Primary | ICD-10-CM

## 2017-11-28 PROCEDURE — 99024 POSTOP FOLLOW-UP VISIT: CPT | Performed by: PHYSICIAN ASSISTANT

## 2017-11-28 PROCEDURE — 1036F TOBACCO NON-USER: CPT | Performed by: PHYSICIAN ASSISTANT

## 2017-11-28 PROCEDURE — G8419 CALC BMI OUT NRM PARAM NOF/U: HCPCS | Performed by: PHYSICIAN ASSISTANT

## 2017-11-28 PROCEDURE — G8484 FLU IMMUNIZE NO ADMIN: HCPCS | Performed by: PHYSICIAN ASSISTANT

## 2017-11-28 PROCEDURE — G8427 DOCREV CUR MEDS BY ELIG CLIN: HCPCS | Performed by: PHYSICIAN ASSISTANT

## 2017-11-29 NOTE — PROGRESS NOTES
Katherine Ferrer comes in with concerns that her left superior drain is not draining. She has had issues with the left wound vac. Dr. Cyn Holloway saw her today too. We removed the superior drain bilaterally. We also removed the wound vacs. She will see Dr. Cny Holloway next week.

## 2017-12-01 ENCOUNTER — TELEPHONE (OUTPATIENT)
Dept: SURGERY | Age: 42
End: 2017-12-01

## 2017-12-01 RX ORDER — HYDROCODONE BITARTRATE AND ACETAMINOPHEN 5; 325 MG/1; MG/1
TABLET ORAL
Qty: 45 TABLET | Refills: 0 | Status: SHIPPED | OUTPATIENT
Start: 2017-12-01 | End: 2018-01-04 | Stop reason: ALTCHOICE

## 2017-12-01 NOTE — DISCHARGE SUMMARY
Physician Discharge Summary         Patient ID:  Chris Ramos  [id number]  3 y.o. Admit date: 11/21/2017    Discharge date and time: 11/22/2017 10:20 PM     Admitting Physician: Dante Teague MD     Admission Diagnoses: S/P bilateral mastectomy [Z90.13]    Discharge Diagnoses:   A.  Breast, right nipple biopsy: Benign breast parenchyma. Gino Guerin, left nipple biopsy: Benign breast parenchyma. C.  Breast, right skin and nipple sparing mastectomy:   1.  Changes consistent with fibrocystic mastopathy.   2.  Moderate chronic inflammation.   3.  Moderate ductal hyperplasia of the usual type.   4.  Negative for evidence of malignancy. D.  Breast, left skin and nipple sparing mastectomy:   1.  Changes consistent with fibrocystic mastopathy.   2.  Moderate chronic inflammation.   3.  Focal sclerosing adenosis.  4.  Focal moderate ductal hyperplasia of the usual type.   5.  Negative for evidence of malignancy. Patient Active Problem List   Diagnosis    Breast pain    Dense breasts    Lump or mass in breast    Diffuse cystic mastopathy    S/P bilateral mastectomy       Procedure:  BREAST MASTECTOMY BILATERAL WITH IMMEDIATE RECONSTRUCTION (N/A Breast)    Discharged Condition: good    Hospital Course:   Ms. Rowan Hoover is a 52-year-old female with a history of mastodynia. She has had minimal success with conservative treatment. Risks benefits and alternatives of surgery were discussed with her by Dr. Paco Storey. She underwent the above-stated surgery tolerated the procedure well. Day 1 postoperatively, her wound VAC was not working well, her Eddie-Mckeon drains were clear. She showed progressive improvement and was subsequently discharged to home. Consults: none    Disposition: home    Patient Instructions:    Activity: As per mastectomy instruction sheet  Diet: regular diet  Wound Care:  monitor CARLOS output and record totals daily  Follow-up with Dr Paco Storey in one week    Signed:  Electronically signed by

## 2017-12-04 ENCOUNTER — OFFICE VISIT (OUTPATIENT)
Dept: SURGERY | Age: 42
End: 2017-12-04

## 2017-12-04 VITALS
DIASTOLIC BLOOD PRESSURE: 74 MMHG | WEIGHT: 117 LBS | SYSTOLIC BLOOD PRESSURE: 122 MMHG | BODY MASS INDEX: 18.36 KG/M2 | TEMPERATURE: 98.7 F | HEART RATE: 72 BPM | RESPIRATION RATE: 16 BRPM | HEIGHT: 67 IN

## 2017-12-04 DIAGNOSIS — Z90.13 S/P BILATERAL MASTECTOMY: Primary | ICD-10-CM

## 2017-12-04 PROCEDURE — 99024 POSTOP FOLLOW-UP VISIT: CPT | Performed by: SURGERY

## 2017-12-11 ENCOUNTER — OFFICE VISIT (OUTPATIENT)
Dept: SURGERY | Age: 42
End: 2017-12-11

## 2017-12-11 ENCOUNTER — HOSPITAL ENCOUNTER (OUTPATIENT)
Dept: WOMENS IMAGING | Age: 42
Discharge: HOME | End: 2017-12-11
Payer: COMMERCIAL

## 2017-12-11 VITALS
WEIGHT: 116.2 LBS | BODY MASS INDEX: 18.24 KG/M2 | SYSTOLIC BLOOD PRESSURE: 120 MMHG | HEART RATE: 80 BPM | RESPIRATION RATE: 18 BRPM | HEIGHT: 67 IN | DIASTOLIC BLOOD PRESSURE: 70 MMHG

## 2017-12-11 DIAGNOSIS — Z90.13 S/P BILATERAL MASTECTOMY: ICD-10-CM

## 2017-12-11 DIAGNOSIS — N63.10 BREAST MASS, RIGHT: ICD-10-CM

## 2017-12-11 DIAGNOSIS — N63.20 MASS OF LEFT BREAST: Primary | ICD-10-CM

## 2017-12-11 DIAGNOSIS — N64.89 SEROMA OF BREAST: ICD-10-CM

## 2017-12-11 PROCEDURE — 99024 POSTOP FOLLOW-UP VISIT: CPT | Performed by: SURGERY

## 2017-12-11 PROCEDURE — 76642 ULTRASOUND BREAST LIMITED: CPT

## 2017-12-12 NOTE — PROGRESS NOTES
HISTORY OF PRESENT ILLNESS:  Ms. Samy Brito  is a 40 y.o.   female   who is status post bilateral mastectomy and prepectoral reconstruction on 11/21/17. Pathology showed no evidence of malignancy. Procedure was done for chronic ongoing breast pain. She had immediate reconstruction   We removed the drains last week without difficulty      PHYSICAL EXAM:  The  wounds look good with no evidence of infection, fluid accumulation, or skin necrosis. It is difficult to assess whether there is any fluid there are not. We took her over to ultrasound where ultrasound demonstrated no significant fluid at all on the left. There was tiny bit adjacent to the implant on the right but nothing would require drainage. IMPRESSION:    Doing well s/p  bilateral mastectomy and reconstruction    PLAN:  I will see her back in early January. She will call if anything changes. She is hoping to be able to drive same. She is anticipating being able to about 10 more days and hopefully she'll be able to return to work at that time. I spent over 50% of this visit counseling patient. 15 minutes of face to face time with patient. She is hoping to be able to travel to [facility] for a meeting in the next week or so. We will see her back next week to discuss this.

## 2017-12-18 ENCOUNTER — TELEPHONE (OUTPATIENT)
Dept: SURGERY | Age: 42
End: 2017-12-18

## 2017-12-18 NOTE — TELEPHONE ENCOUNTER
Pt not ready to return to work. Would like to stay off until after she sees Dr. Audie Wayne on 4th. Insurance forms printed and I will correct them and fax in.

## 2018-01-04 ENCOUNTER — OFFICE VISIT (OUTPATIENT)
Dept: SURGERY | Age: 43
End: 2018-01-04

## 2018-01-04 VITALS
HEART RATE: 74 BPM | BODY MASS INDEX: 18.11 KG/M2 | RESPIRATION RATE: 18 BRPM | SYSTOLIC BLOOD PRESSURE: 112 MMHG | WEIGHT: 115.4 LBS | HEIGHT: 67 IN | DIASTOLIC BLOOD PRESSURE: 68 MMHG

## 2018-01-04 DIAGNOSIS — Z90.13 S/P BILATERAL MASTECTOMY: Primary | ICD-10-CM

## 2018-01-04 PROCEDURE — 99024 POSTOP FOLLOW-UP VISIT: CPT | Performed by: SURGERY

## 2018-01-04 NOTE — LETTER
[address] [facility]  Phone: [phone]  Fax: Nita Georges MD        January 4, 2018     Patient: Kareem Wing   YOB: 1975   Date of Visit: 1/4/2018       To Whom It May Concern: It is my medical opinion that Dorothy Strong be returned to full duty work, without restriction, beginning Friday, 1/5/18. Thank you. If you have any questions or concerns, please don't hesitate to call.     Sincerely,      Electronically Signed:    Nadja Pozo MD

## 2018-04-04 ENCOUNTER — OFFICE VISIT (OUTPATIENT)
Dept: SURGERY | Age: 43
End: 2018-04-04
Payer: COMMERCIAL

## 2018-04-04 VITALS
DIASTOLIC BLOOD PRESSURE: 68 MMHG | BODY MASS INDEX: 18.36 KG/M2 | HEIGHT: 67 IN | WEIGHT: 117 LBS | HEART RATE: 80 BPM | SYSTOLIC BLOOD PRESSURE: 114 MMHG

## 2018-04-04 DIAGNOSIS — Z90.13 S/P BILATERAL MASTECTOMY: Primary | ICD-10-CM

## 2018-04-04 PROCEDURE — G8419 CALC BMI OUT NRM PARAM NOF/U: HCPCS | Performed by: SURGERY

## 2018-04-04 PROCEDURE — 99213 OFFICE O/P EST LOW 20 MIN: CPT | Performed by: SURGERY

## 2018-04-04 PROCEDURE — 1036F TOBACCO NON-USER: CPT | Performed by: SURGERY

## 2018-04-04 PROCEDURE — G8428 CUR MEDS NOT DOCUMENT: HCPCS | Performed by: SURGERY

## 2018-09-09 ENCOUNTER — OFFICE VISIT (OUTPATIENT)
Dept: URGENT CARE | Age: 43
End: 2018-09-09
Payer: COMMERCIAL

## 2018-09-09 VITALS
WEIGHT: 108 LBS | TEMPERATURE: 97.6 F | RESPIRATION RATE: 16 BRPM | BODY MASS INDEX: 16.95 KG/M2 | HEART RATE: 77 BPM | HEIGHT: 67 IN | DIASTOLIC BLOOD PRESSURE: 63 MMHG | SYSTOLIC BLOOD PRESSURE: 103 MMHG | OXYGEN SATURATION: 98 %

## 2018-09-09 DIAGNOSIS — J01.41 ACUTE RECURRENT PANSINUSITIS: Primary | ICD-10-CM

## 2018-09-09 PROCEDURE — G8427 DOCREV CUR MEDS BY ELIG CLIN: HCPCS | Performed by: FAMILY MEDICINE

## 2018-09-09 PROCEDURE — 99213 OFFICE O/P EST LOW 20 MIN: CPT | Performed by: FAMILY MEDICINE

## 2018-09-09 PROCEDURE — 1036F TOBACCO NON-USER: CPT | Performed by: FAMILY MEDICINE

## 2018-09-09 PROCEDURE — G8419 CALC BMI OUT NRM PARAM NOF/U: HCPCS | Performed by: FAMILY MEDICINE

## 2018-09-09 RX ORDER — DOXYCYCLINE 100 MG/1
100 CAPSULE ORAL 2 TIMES DAILY
Qty: 20 CAPSULE | Refills: 0 | Status: SHIPPED | OUTPATIENT
Start: 2018-09-09 | End: 2021-10-21 | Stop reason: ALTCHOICE

## 2018-09-09 NOTE — PATIENT INSTRUCTIONS
hot, wet towel or a warm gel pack on your face 3 or 4 times a day for 5 to 10 minutes each time. · Try a decongestant nasal spray like oxymetazoline (Afrin). Do not use it for more than 3 days in a row. Using it for more than 3 days can make your congestion worse. When should you call for help? Call your doctor now or seek immediate medical care if:    · You have new or worse swelling or redness in your face or around your eyes.     · You have a new or higher fever.    Watch closely for changes in your health, and be sure to contact your doctor if:    · You have new or worse facial pain.     · The mucus from your nose becomes thicker (like pus) or has new blood in it.     · You are not getting better as expected. Where can you learn more? Go to [email or web address]://[email or web address] [email or web address]. org and sign in to your [employer] account. Enter A900 in the mParticle box to learn more about \"Sinusitis: Care Instructions. \"     If you do not have an account, please click on the \"Sign Up Now\" link. Current as of: May 12, 2017  Content Version: 11.7  © 1898-6182 [employer], [employer]. Care instructions adapted under license by [facility] ([facility]). If you have questions about a medical condition or this instruction, always ask your healthcare professional. [address] any warranty or liability for your use of this information.

## 2018-09-09 NOTE — PROGRESS NOTES
Merissa Crowley is a 40 y.o. female    Chief Complaint   Patient presents with    Nasal Congestion    Cough    Chills       HPI    Review of Systems    Prior to Admission medications    Medication Sig Start Date End Date Taking? Authorizing Provider   doxycycline monohydrate (MONODOX) 100 MG capsule Take 1 capsule by mouth 2 times daily 9/9/18  Yes Komal Stuart MD   acetaminophen (TYLENOL) 325 MG tablet Take 650 mg by mouth every 6 hours as needed for Pain   Yes Historical Provider, MD   ondansetron (ZOFRAN) 4 MG tablet Take 1 tablet by mouth every 6 hours as needed for Nausea or Vomiting 11/22/17   Ulysses Palomino MD   Ascorbic Acid (VITAMIN C) 500 MG tablet Take 500 mg by mouth daily    Historical Provider, MD   BL EVENING PRIMROSE OIL PO Take 1 capsule by mouth daily     Historical Provider, MD   Vitamin E 100 UNITS TABS Take by mouth    Historical Provider, MD   Flaxseed, Linseed, (FLAX SEEDS PO) Take 1 capsule by mouth daily     Historical Provider, MD   lidocaine (LIDODERM) 5 % Place 1 patch onto the skin every 24 hours 12 hours on, 12 hours off. 8/1/16   Ulysses Palomino MD   ibuprofen (ADVIL;MOTRIN) 200 MG tablet Take 800 mg by mouth every 6 hours as needed for Pain     Historical Provider, MD       Past Medical History:   Diagnosis Date    Abnormal Pap smear of cervix     Dense breasts 05/06/2016    Irritable bowel syndrome        Past Surgical History:   Procedure Laterality Date    BLADDER REPAIR  stent    6 YR.  OLD    BREAST SURGERY  07/15/2016    Bilateral cyst aspiration & Biopsies; LOCAL/ORAL SEDATION    LEEP  1999    MASTECTOMY N/A 11/21/2017    BREAST MASTECTOMY BILATERAL WITH IMMEDIATE RECONSTRUCTION performed by Ulysses Palomino MD at [address] [place] [address]  2004       Social History     Social History    Marital status:      Spouse name: N/A    Number of children: N/A    Years of education: N/A     Social History Main Topics    Smoking status: Former Smoker more than 3 days in a row. Using it for more than 3 days can make your congestion worse. When should you call for help? Call your doctor now or seek immediate medical care if:    · You have new or worse swelling or redness in your face or around your eyes.     · You have a new or higher fever.    Watch closely for changes in your health, and be sure to contact your doctor if:    · You have new or worse facial pain.     · The mucus from your nose becomes thicker (like pus) or has new blood in it.     · You are not getting better as expected. Where can you learn more? Go to https://[employer].Arkmicro. org and sign in to your [employer] account. Enter Q376 in the Natero box to learn more about \"Sinusitis: Care Instructions. \"     If you do not have an account, please click on the \"Sign Up Now\" link. Current as of: May 12, 2017  Content Version: 11.7  © 1005-3098 [employer]. Care instructions adapted under license by [facility] ([facility] and the [facility]). If you have questions about a medical condition or this instruction, always ask your healthcare professional. [place] [address] any warranty or liability for your use of this information.

## 2018-10-24 ENCOUNTER — TELEPHONE (OUTPATIENT)
Dept: SURGERY | Age: 43
End: 2018-10-24

## 2018-10-24 ENCOUNTER — OFFICE VISIT (OUTPATIENT)
Dept: SURGERY | Age: 43
End: 2018-10-24
Payer: COMMERCIAL

## 2018-10-24 VITALS — SYSTOLIC BLOOD PRESSURE: 100 MMHG | DIASTOLIC BLOOD PRESSURE: 60 MMHG | HEART RATE: 72 BPM

## 2018-10-24 DIAGNOSIS — Z90.13 S/P BILATERAL MASTECTOMY: Primary | ICD-10-CM

## 2018-10-24 PROCEDURE — G8484 FLU IMMUNIZE NO ADMIN: HCPCS | Performed by: SURGERY

## 2018-10-24 PROCEDURE — 1036F TOBACCO NON-USER: CPT | Performed by: SURGERY

## 2018-10-24 PROCEDURE — G8419 CALC BMI OUT NRM PARAM NOF/U: HCPCS | Performed by: SURGERY

## 2018-10-24 PROCEDURE — 99213 OFFICE O/P EST LOW 20 MIN: CPT | Performed by: SURGERY

## 2018-10-24 PROCEDURE — G8427 DOCREV CUR MEDS BY ELIG CLIN: HCPCS | Performed by: SURGERY

## 2018-11-01 ENCOUNTER — HOSPITAL ENCOUNTER (OUTPATIENT)
Age: 43
Setting detail: SPECIMEN
Discharge: HOME | End: 2018-11-01
Payer: COMMERCIAL

## 2018-11-01 ENCOUNTER — PROCEDURE VISIT (OUTPATIENT)
Dept: SURGERY | Age: 43
End: 2018-11-01
Payer: COMMERCIAL

## 2018-11-01 VITALS — WEIGHT: 111 LBS | HEIGHT: 67 IN | BODY MASS INDEX: 17.42 KG/M2

## 2018-11-01 DIAGNOSIS — R22.9 SKIN MASS: Primary | ICD-10-CM

## 2018-11-01 PROCEDURE — 88307 TISSUE EXAM BY PATHOLOGIST: CPT

## 2018-11-01 PROCEDURE — 12031 INTMD RPR S/A/T/EXT 2.5 CM/<: CPT | Performed by: PHYSICIAN ASSISTANT

## 2018-11-01 PROCEDURE — 11401 EXC TR-EXT B9+MARG 0.6-1 CM: CPT | Performed by: PHYSICIAN ASSISTANT

## 2018-11-09 ENCOUNTER — TELEPHONE (OUTPATIENT)
Dept: SURGERY | Age: 43
End: 2018-11-09

## 2019-03-11 ENCOUNTER — OFFICE VISIT (OUTPATIENT)
Dept: OBSTETRICS AND GYNECOLOGY | Facility: CLINIC | Age: 44
End: 2019-03-11

## 2019-03-11 VITALS
SYSTOLIC BLOOD PRESSURE: 90 MMHG | WEIGHT: 109 LBS | BODY MASS INDEX: 17.11 KG/M2 | HEIGHT: 67 IN | DIASTOLIC BLOOD PRESSURE: 58 MMHG

## 2019-03-11 DIAGNOSIS — Z01.419 ENCOUNTER FOR GYNECOLOGICAL EXAMINATION WITHOUT ABNORMAL FINDING: Primary | ICD-10-CM

## 2019-03-11 DIAGNOSIS — Z12.4 SCREENING FOR CERVICAL CANCER: ICD-10-CM

## 2019-03-11 PROCEDURE — G0123 SCREEN CERV/VAG THIN LAYER: HCPCS | Performed by: OBSTETRICS & GYNECOLOGY

## 2019-03-11 PROCEDURE — 87624 HPV HI-RISK TYP POOLED RSLT: CPT | Performed by: OBSTETRICS & GYNECOLOGY

## 2019-03-11 PROCEDURE — 99386 PREV VISIT NEW AGE 40-64: CPT | Performed by: OBSTETRICS & GYNECOLOGY

## 2019-03-11 RX ORDER — ASCORBIC ACID 500 MG
500 TABLET ORAL
COMMUNITY

## 2019-03-11 RX ORDER — IBUPROFEN 200 MG
800 TABLET ORAL
COMMUNITY

## 2019-03-11 NOTE — PROGRESS NOTES
"Subjective   Angelina Rodriguez is a 43 y.o. female.     CC: annual exam    History of Present Illness   SUBJECTIVE: Angelina Rodriguez is a 43 y.o. female , para 3, who comes to the office today for annual GYN examination. Last menstrual period was 1 week ago and her last Pap smear was 2016, and was normal by her report. She had a LEEP for cervical dysplasia in 1999. She had a tubal ligation for contraception and has no complaints about her menstrual cycles. Her medical history is reviewed. She had a prophylactic bilateral mastectomy for fibrocystic breasts and family history.    Social History     Tobacco Use   • Smoking status: Current Every Day Smoker     Packs/day: 0.50     Types: Cigarettes   • Smokeless tobacco: Never Used   Substance Use Topics   • Alcohol use: Yes   • Drug use: No     Review of Systems   Constitutional: Negative for unexpected weight loss.   Gastrointestinal: Negative for abdominal pain.   Genitourinary: Negative for menstrual problem.   Hematological: Does not bruise/bleed easily.       Visit Vitals  BP 90/58 (BP Location: Left arm, Patient Position: Sitting)   Ht 170.2 cm (67\")   Wt 49.4 kg (109 lb)   LMP 03/07/2019 (Exact Date)   BMI 17.07 kg/m²      Objective   Physical Exam   Constitutional: She is oriented to person, place, and time. She appears well-developed and well-nourished. No distress.   HENT:   Head: Normocephalic and atraumatic.   Eyes: EOM are normal.   Neck: Normal range of motion. Neck supple.   Cardiovascular: Normal rate and regular rhythm.   No murmur heard.  Pulmonary/Chest: Effort normal and breath sounds normal.   Abdominal: Soft. She exhibits no distension. There is no tenderness.   Genitourinary: Vagina normal and uterus normal. Pelvic exam was performed with patient supine. There is no tenderness or lesion on the right labia. There is no tenderness or lesion on the left labia. Cervix does not exhibit motion tenderness, discharge or friability. Right adnexum displays no " tenderness and no fullness. Left adnexum displays no tenderness and no fullness. No tenderness or bleeding in the vagina. No vaginal discharge found.   Genitourinary Comments: A pap smear was performed   Musculoskeletal: Normal range of motion. She exhibits no edema.   Neurological: She is alert and oriented to person, place, and time.   Skin: Skin is warm and dry.   Psychiatric: She has a normal mood and affect. Her behavior is normal. Judgment normal.   Nursing note and vitals reviewed.    Assessment/Plan   Angelina was seen today for gynecologic exam.    Diagnoses and all orders for this visit:    Encounter for gynecological examination without abnormal finding    Screening for cervical cancer  -     Liquid-based Pap Smear, Screening        We will notify her when the Pap smear results are available.  She will return in one year. In the meantime if she develops questions or problems, she will notify the office.

## 2019-03-13 LAB
GEN CATEG CVX/VAG CYTO-IMP: NORMAL
HPV I/H RISK 4 DNA CVX QL PROBE+SIG AMP: NOT DETECTED
LAB AP CASE REPORT: NORMAL
LAB AP GYN ADDITIONAL INFORMATION: NORMAL
LAB AP GYN OTHER FINDINGS: NORMAL
PATH INTERP SPEC-IMP: NORMAL
STAT OF ADQ CVX/VAG CYTO-IMP: NORMAL

## 2019-10-21 ENCOUNTER — OFFICE VISIT (OUTPATIENT)
Dept: OBSTETRICS AND GYNECOLOGY | Facility: CLINIC | Age: 44
End: 2019-10-21

## 2019-10-21 ENCOUNTER — OFFICE VISIT (OUTPATIENT)
Dept: SURGERY | Age: 44
End: 2019-10-21
Payer: COMMERCIAL

## 2019-10-21 VITALS — DIASTOLIC BLOOD PRESSURE: 60 MMHG | SYSTOLIC BLOOD PRESSURE: 100 MMHG | HEART RATE: 68 BPM

## 2019-10-21 VITALS
HEIGHT: 67 IN | SYSTOLIC BLOOD PRESSURE: 100 MMHG | BODY MASS INDEX: 16.64 KG/M2 | DIASTOLIC BLOOD PRESSURE: 62 MMHG | WEIGHT: 106 LBS

## 2019-10-21 DIAGNOSIS — Z90.13 S/P BILATERAL MASTECTOMY: Primary | ICD-10-CM

## 2019-10-21 DIAGNOSIS — N97.0 ANOVULATORY (DYSFUNCTIONAL UTERINE) BLEEDING: Primary | ICD-10-CM

## 2019-10-21 PROCEDURE — 99213 OFFICE O/P EST LOW 20 MIN: CPT | Performed by: SURGERY

## 2019-10-21 PROCEDURE — G8419 CALC BMI OUT NRM PARAM NOF/U: HCPCS | Performed by: SURGERY

## 2019-10-21 PROCEDURE — 99213 OFFICE O/P EST LOW 20 MIN: CPT | Performed by: OBSTETRICS & GYNECOLOGY

## 2019-10-21 PROCEDURE — G8484 FLU IMMUNIZE NO ADMIN: HCPCS | Performed by: SURGERY

## 2019-10-21 PROCEDURE — G8427 DOCREV CUR MEDS BY ELIG CLIN: HCPCS | Performed by: SURGERY

## 2019-10-21 PROCEDURE — 1036F TOBACCO NON-USER: CPT | Performed by: SURGERY

## 2019-10-21 RX ORDER — MEDROXYPROGESTERONE ACETATE 10 MG/1
10 TABLET ORAL DAILY
Qty: 7 TABLET | Refills: 3 | Status: SHIPPED | OUTPATIENT
Start: 2019-10-21

## 2019-10-21 NOTE — PROGRESS NOTES
"Angelina Rodriguez is a 43 y.o. female here today for management of irregular periods.  For the past 5 months she has had irregular menstrual bleeding, occurring every other week and lasting up to 2 to 3 weeks.  Prior to this her cycles were regular.  She has not had any recent changes in her medications, and denies weakness or dizziness.  She does smoke.    Visit Vitals  /62 (BP Location: Left arm, Patient Position: Sitting)   Ht 170.2 cm (67\")   Wt 48.1 kg (106 lb)   LMP 09/16/2019   BMI 16.60 kg/m²      Pleasant female no acute distress  Mood and affect normal  Breathing unlabored    Assessment: Dysfunctional uterine bleeding    We discussed anovulatory bleeding as a common cause of abnormal uterine bleeding.  We discussed management options, and she would prefer minimal interventions.  Therefore, I have given her a prescription for a Provera withdrawal, and she will return to the office in 2 to 3 months for recheck of her symptoms.  In the meantime if she has questions or concerns she will contact the office.      "

## 2020-10-22 ENCOUNTER — OFFICE VISIT (OUTPATIENT)
Dept: SURGERY | Age: 45
End: 2020-10-22
Payer: MEDICAID

## 2020-10-22 VITALS — SYSTOLIC BLOOD PRESSURE: 110 MMHG | HEART RATE: 76 BPM | DIASTOLIC BLOOD PRESSURE: 60 MMHG

## 2020-10-22 PROCEDURE — 99213 OFFICE O/P EST LOW 20 MIN: CPT | Performed by: SURGERY

## 2020-10-22 RX ORDER — CETIRIZINE HYDROCHLORIDE 10 MG/1
TABLET ORAL
COMMUNITY
Start: 2020-09-09

## 2020-10-22 NOTE — PROGRESS NOTES
HISTORY OF PRESENT ILLNESS:  Ms. Shelli Hatfield  is a 38 y.o.   female who presents today for an annual breast exam.    She underwent bilateral mastectomy and prepectoral reconstruction on 11/21/17. Pathology showed no evidence of malignancy. Procedure was done for chronic ongoing breast pain. She had immediate reconstruction with permanent implant placement      Unilateral Left Ultrasound-12/11/2017  Report: Sonographic images were obtained in the right breast in the    4:00 region, demonstrating a small pocket of fluid adjacent to the    breast implant, measuring approximately 1.1 x 1.0 cm. The fluid is    uncomplicated and most likely represents a small seroma.         Impression    1.1 x 1.0 cm probable seroma adjacent to the right breast    implant at 4:00. Signed by Dr Ricardo Greenberg. Leni on 12/11/2017 4:52 PM      She is doing quite well . Her breast pain has resolved. She has minimal tenderness but is back to her usual level of activity and work    Her biggest stresser most recently is that her daughter was shot in the knee during the [place] shootings. She is doing fine but it has been a very traumatic experience for their whole family. Daughter spent spring break(y in [place] so she seemed to be making progress. She was also mildly ill with Covid last January. Prior to its first case in the [address] [place] [address]. 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 PHYSICAL EXAM:  The  wounds look good with no evidence of infection, fluid accumulation, or skin necrosis. She has gained about 5 pounds and this has really smoothed out the edges all the way around her implants. She looks great and is quite pleased. IMPRESSION:    Doing well s/p  bilateral mastectomy and reconstruction    PLAN: Return in one year for a physical exam. She will call with any concerns or changes.        I have seen, examined and reviewed this patient medication list, appropriate labs and imaging studies. I reviewed relevant medical records and others physicians notes. I discussed the plans of care with the patient. I answered all the questions to the patients satisfaction. I, Dr Katlyn Hernandez, personally performed the services described in this documentation as scribed by Mary Ann Arnold MA in my presence and is both accurate and complete. (Please note that portions of this note were completed with a voice recognition program. Efforts were made to edit the dictations but occasionally words are mis-transcribed.)  Over 50% of the total visit time of 15 minutes in face to face encounter with the patient, out of which more than 50% of the time was spent in counseling patient or family and coordination of care. Counseling included but was not limited to time spent reviewing labs, imaging studies/ treatment plan and answering questions.

## 2021-10-21 ENCOUNTER — OFFICE VISIT (OUTPATIENT)
Dept: GASTROENTEROLOGY | Age: 46
End: 2021-10-21
Payer: MEDICAID

## 2021-10-21 VITALS
HEART RATE: 72 BPM | HEIGHT: 67 IN | SYSTOLIC BLOOD PRESSURE: 92 MMHG | DIASTOLIC BLOOD PRESSURE: 54 MMHG | OXYGEN SATURATION: 98 % | WEIGHT: 110.8 LBS | BODY MASS INDEX: 17.39 KG/M2

## 2021-10-21 DIAGNOSIS — D64.9 CHRONIC ANEMIA: ICD-10-CM

## 2021-10-21 DIAGNOSIS — K62.5 BRBPR (BRIGHT RED BLOOD PER RECTUM): ICD-10-CM

## 2021-10-21 DIAGNOSIS — K52.9 CHRONIC DIARRHEA: Primary | ICD-10-CM

## 2021-10-21 PROCEDURE — 99203 OFFICE O/P NEW LOW 30 MIN: CPT | Performed by: NURSE PRACTITIONER

## 2021-10-21 RX ORDER — LORATADINE 10 MG/1
10 TABLET ORAL PRN
COMMUNITY

## 2021-10-21 ASSESSMENT — ENCOUNTER SYMPTOMS
CONSTIPATION: 0
VOICE CHANGE: 0
BACK PAIN: 0
SHORTNESS OF BREATH: 0
ABDOMINAL DISTENTION: 0
ANAL BLEEDING: 1
SORE THROAT: 0
DIARRHEA: 1
ABDOMINAL PAIN: 0
BLOOD IN STOOL: 0
TROUBLE SWALLOWING: 0
NAUSEA: 0
RECTAL PAIN: 0
VOMITING: 0
COUGH: 0

## 2021-10-21 NOTE — PROGRESS NOTES
Subjective: Jeanie Armas is a43 y.o. female  Chief Complaint   Patient presents with    New Patient    Diarrhea       HPI  PCP: Aracelis Quinonez MD  Referring Provider: SHEYLA Alcocer -*  New pt referral  For chronic diarrhea. Since early childhood  Reports she has diarrhea 3-4 days out of the week  Has 2-3 diarrhea stools on the days she has the diarrhea. Watery diarrhea sometimes. Sometimes has abd cramping and gurgling prior to a diarrhea episode  She reports she has had covid twice (Jan 2020 and Dec 2020) and since this time her diarrhea is worse than previous to covid. And has awful smell with this. Stools have not been checked to r/o infectious etiology. Reports occasional BRBPR noted on TP with wiping, which is chronic and intermittent for years. Reports if she takes iron daily or if she eats nuts/cheese regularly she has constipation but if she avoids these she doesn't have constipation. She reports \" anemia all my life. \"  She reports she has labs with her PCP tomorrow and has not had any labs recently. She does not want to have labs done here since she is doing labs with her PCP  No results of labs provided with referral info for my review. Reports she is underweight but has been this way all of her life. Has paid lots of money on special diets to try to gain weight but she just cant gain. Family HX:  Maternal grandfather had colon cancer; paternal grandmother had gastric cancer, mother had colon polyps, father had colon polyps, sister had colon polyps  Pt denies family hx of inflammatory bowel dx, and esophageal CA. Past Medical History:   Diagnosis Date    Abnormal Pap smear of cervix     Dense breasts 05/06/2016    Irritable bowel syndrome           Past Surgical History:   Procedure Laterality Date    BLADDER REPAIR  stent    6 YR.  OLD    BREAST SURGERY  07/15/2016    Bilateral cyst aspiration & Biopsies; LOCAL/ORAL SEDATION    COLONOSCOPY  1988    Dx IBS per patient    EGD  [address] [place] N/A 11/21/2017    BREAST MASTECTOMY BILATERAL WITH IMMEDIATE RECONSTRUCTION performed by Omer Chong MD at [address] [place] [address]  2004       Social History     Socioeconomic History    Marital status:      Spouse name: None    Number of children: None    Years of education: None    Highest education level: None   Occupational History    None   Tobacco Use    Smoking status: Current Every Day Smoker     Packs/day: 0.25     Years: 25.00     Pack years: 6.25    Smokeless tobacco: Never Used   Vaping Use    Vaping Use: Never used   Substance and Sexual Activity    Alcohol use: Yes     Comment: occ    Drug use: No    Sexual activity: Yes   Other Topics Concern    None   Social History Narrative    None     Social Determinants of Health     Financial Resource Strain:     Difficulty of Paying Living Expenses:    Food Insecurity:     Worried About Running Out of Food in the Last Year:     Ran Out of Food in the Last Year:    Transportation Needs:     Lack of Transportation (Medical):  Lack of Transportation (Non-Medical):    Physical Activity:     Days of Exercise per Week:     Minutes of Exercise per Session:    Stress:     Feeling of Stress :    Social Connections:     Frequency of Communication with Friends and Family:     Frequency of Social Gatherings with Friends and Family:     Attends [employer] Services:     Active Member of Clubs or Organizations:     Attends Club or Organization Meetings:     Marital Status:    Intimate Partner Violence:     Fear of Current or Ex-Partner:     Emotionally Abused:     Physically Abused:     Sexually Abused:         Allergies   Allergen Reactions    Sulfa Antibiotics Hives and Itching    Bactrim [Sulfamethoxazole-Trimethoprim] Hives, Itching and Rash    Pcn [Penicillins] Rash     Childhood allergy       Current Outpatient Medications   Medication Sig Dispense Refill    loratadine (CLARITIN) 10 MG tablet Take 10 mg by mouth as needed      Multiple Vitamin (MULTI-VITAMIN PO) Take by mouth daily      VITAMIN D PO Take by mouth daily      acetaminophen (TYLENOL) 325 MG tablet Take 650 mg by mouth every 6 hours as needed for Pain      ibuprofen (ADVIL;MOTRIN) 200 MG tablet Take 800 mg by mouth every 6 hours as needed for Pain       cetirizine (ZYRTEC) 10 MG tablet TK 1 T PO  BID (Patient not taking: No sig reported)       No current facility-administered medications for this visit. Review of Systems   Constitutional: Negative for appetite change, fatigue, fever and unexpected weight change. HENT: Negative for sore throat, trouble swallowing and voice change. Respiratory: Negative for cough and shortness of breath. Cardiovascular: Negative for chest pain, palpitations and leg swelling. Gastrointestinal: Positive for anal bleeding and diarrhea. Negative for abdominal distention, abdominal pain, blood in stool, constipation, nausea, rectal pain and vomiting. Genitourinary: Negative for hematuria. Musculoskeletal: Positive for arthralgias. Negative for back pain and neck pain. Neurological: Negative for dizziness, weakness, light-headedness and headaches. Psychiatric/Behavioral: Positive for dysphoric mood. Negative for sleep disturbance. The patient is nervous/anxious. All other systems reviewed and are negative. Objective:     Physical Exam  Vitals and nursing note reviewed. Constitutional:       Appearance: She is well-developed. Comments: BP (!) 92/54 (Site: Right Upper Arm)   Pulse 72   Ht 5' 7\" (1.702 m)   Wt 110 lb 12.8 oz (50.3 kg)   SpO2 98%   BMI 17.35 kg/m²    Eyes:      General: No scleral icterus. Conjunctiva/sclera: Conjunctivae normal.      Pupils: Pupils are equal, round, and reactive to light. Neck:      Thyroid: No thyromegaly. Cardiovascular:      Rate and Rhythm: Normal rate and regular rhythm.       Heart sounds: Normal heart sounds. No murmur heard. No friction rub. No gallop. Pulmonary:      Effort: Pulmonary effort is normal. No respiratory distress. Breath sounds: Normal breath sounds. Abdominal:      General: Bowel sounds are normal. There is no distension. Palpations: Abdomen is soft. Tenderness: There is abdominal tenderness (upper abd with palpation on exam, she wasnt aware of this tenderness til the exam). There is no rebound. Musculoskeletal:         General: No deformity. Normal range of motion. Cervical back: Normal range of motion and neck supple. Neurological:      Mental Status: She is alert and oriented to person, place, and time. Cranial Nerves: No cranial nerve deficit. Psychiatric:         Judgment: Judgment normal.           Assessment:       Diagnosis Orders   1. Chronic diarrhea  COLONOSCOPY W/ OR W/O BIOPSY    ESOPHAGOSCOPY / EGD   2. BRBPR (bright red blood per rectum)  COLONOSCOPY W/ OR W/O BIOPSY   3. Chronic anemia  COLONOSCOPY W/ OR W/O BIOPSY    ESOPHAGOSCOPY / EGD         Plan:      1. Sent pt home with diatherix stool testing to r/o infectious etiology  2. Schedule outpatient endoscopy- r/o celiac and h pylori please. Patient advised no Aspirin, Fish Oil, Vit E or NSAIDs 5 (five) days before procedure. Follow-up Visit: per Dr. Donn Harrington  Pt Education:   Risks, benefits, and alternatives to endoscopy were discussed. Patient voices understanding of risks of, but not limited to, perforation, bleeding, and infection. The risk of perforation is increased with esophageal dilatation. All questions answered to patient's satisfaction. Patient is agreable to proceed. 3. Schedule outpatient colonoscopy with random colon bx please. Patient advised no Aspirin, Fish Oil, Vit E or NSAIDs 5 (five) days before procedure. Follow-up Visit: per Dr Donn Harrington  Pt education:  Risks, benefits, and alternatives to colonoscopy were discussed.  Risks of colonoscopy include, but are not limited to, perforation, bleeding, and infection. We discussed that the risk for perforation is 1-3 in 5,000  at the time of colonoscopy;   and 1-2% risk of bleeding post-polypectomy. All questions answered to the satisfaction of the patient. Pt is agreeable to proceed.

## 2021-10-21 NOTE — PATIENT INSTRUCTIONS
You are going to have an Endoscopy and here are some basic instructions:    Nothing to eat or drink after midnight EXCEPT:  PLEASE TAKE MEDICATION(S) FOR HIGH BLOOD PRESSURE, SEIZURES, HEART, AND THYROID WITH A SIP OF WATER AT LEAST 2 HOURS PRIOR TO ARRIVAL TIME.   YOU MAY ALSO TAKE ANY INHALERS YOU ARE PRESCRIBED. You will not be able to drive for 24 hours after the procedure due to sedation. Bring a  with you the day of the procedure. No aspirin, ibuprofen, naproxen, fish oil or vitamin E for 5 days before procedure. Continue current medications. If you are on blood thinners, clearance from the prescribing physician will be obtained before your procedure is scheduled. Increased Lucilius[employer] may be associated with discontinuation of your blood thinner and include, but not limited to, stroke, TIA, or cardiac event. If biopsies are taken during the procedure they will be sent to a pathologist for analysis. You will be notified by mail of the pathology results in 2-3 weeks. Your physician may also schedule a follow up appointment with the nurse practitioner to discuss pathology, symptoms or to check if you have had any problems related to your procedure. If you prefer not to return to the office after your procedure please discuss this with your physician on the day of your procedure. You are going to have a colonoscopy and here are some instructions: You will be given specific directions regarding restrictions to diet and bowel prep instructions including laxatives. Please read these instructions one week prior to your scheduled procedure to ensure that you are prepared. Follow prep instructions provided for bowel prep. Take all of the bowel prep as directed. If you are having problems with nausea, stop your prep for 30-45 min to allow the nausea to subside before resuming your prep.      Nothing to eat or drink after midnight the day of the procedure EXCEPT:  PLEASE TAKE MEDICATION(S) FOR HIGH BLOOD PRESSURE, THYROID, SEIZURES, AND HEART THE MORNING OF THE PROCEDURE WITH A SIP OF WATER  AT LEAST 2 HOURS PRIOR TO ARRIVAL TIME.   YOU MAY ALSO TAKE ANY INHALERS YOU ARE PRESCRIBED. You will not be able to drive for 24 hours after the procedure due to sedation. Bring a  with you the day of the procedure. No aspirin, ibuprofen, naproxen, fish oil or vitamin E for 5 days before procedure. If you are on blood thinners, clearance from the prescribing physician will be obtained before your procedure is scheduled. Increased Angus@[employer] may be associated with discontinuation of your blood thinner and include, but not limited to, stroke, TIA, or cardiac event. If polyps are removed during the procedure they will be sent to a pathologist for analysis. You will be notified by mail of the pathology results in 2-3 weeks. Your physician may also schedule a follow up appointment with the nurse practitioner to discuss pathology, symptoms or to check if you have had any problems related to your procedure. If you prefer not to return to the office after your procedure please discuss this with your physician on the day of your colonoscopy. Final recommendations are based on the pathologist report. Your diet before a colonoscopy bowel preparation is very important to ensure a successful colon exam. It is recommended to consider certain changes to your diet three to four days prior to the procedure. Remember that your bowels need to be empty for the exam.    What foods are good to eat? Cut down on heavy solid foods three to four days before the procedure and start introducing lighter meals to your diet. The following food suggestions are a good part of your diet before a colonoscopy bowel preparation.   Light meat that is easily digestible such as chicken (without the skin)   Potatoes without skin   Cheese   Eggs   A light meal of steamed white fish   Light clear

## 2021-11-08 ENCOUNTER — TELEPHONE (OUTPATIENT)
Dept: GASTROENTEROLOGY | Age: 46
End: 2021-11-08

## 2021-11-09 NOTE — TELEPHONE ENCOUNTER
Stacy Brooke, please let Nola Culver know that the diatherix stool test is negative for anything infectious.

## 2021-11-18 ENCOUNTER — ANESTHESIA EVENT (OUTPATIENT)
Dept: OPERATING ROOM | Age: 46
End: 2021-11-18

## 2021-11-19 ENCOUNTER — APPOINTMENT (OUTPATIENT)
Dept: OPERATING ROOM | Age: 46
End: 2021-11-19

## 2021-11-19 ENCOUNTER — ANESTHESIA (OUTPATIENT)
Dept: OPERATING ROOM | Age: 46
End: 2021-11-19

## 2021-11-19 ENCOUNTER — HOSPITAL ENCOUNTER (OUTPATIENT)
Age: 46
Setting detail: OUTPATIENT SURGERY
Discharge: HOME | End: 2021-11-19
Attending: INTERNAL MEDICINE | Admitting: INTERNAL MEDICINE
Payer: MEDICAID

## 2021-11-19 ENCOUNTER — HOSPITAL ENCOUNTER (OUTPATIENT)
Age: 46
Setting detail: SPECIMEN
Discharge: HOME | End: 2021-11-19
Payer: MEDICAID

## 2021-11-19 VITALS
DIASTOLIC BLOOD PRESSURE: 52 MMHG | HEART RATE: 84 BPM | OXYGEN SATURATION: 100 % | HEIGHT: 67 IN | WEIGHT: 111 LBS | SYSTOLIC BLOOD PRESSURE: 91 MMHG | BODY MASS INDEX: 17.42 KG/M2 | RESPIRATION RATE: 16 BRPM

## 2021-11-19 VITALS — DIASTOLIC BLOOD PRESSURE: 64 MMHG | OXYGEN SATURATION: 98 % | SYSTOLIC BLOOD PRESSURE: 97 MMHG

## 2021-11-19 PROCEDURE — 88305 TISSUE EXAM BY PATHOLOGIST: CPT

## 2021-11-19 PROCEDURE — 88342 IMHCHEM/IMCYTCHM 1ST ANTB: CPT

## 2021-11-19 PROCEDURE — 45380 COLONOSCOPY AND BIOPSY: CPT | Performed by: INTERNAL MEDICINE

## 2021-11-19 PROCEDURE — 43239 EGD BIOPSY SINGLE/MULTIPLE: CPT

## 2021-11-19 PROCEDURE — 43239 EGD BIOPSY SINGLE/MULTIPLE: CPT | Performed by: INTERNAL MEDICINE

## 2021-11-19 PROCEDURE — 45380 COLONOSCOPY AND BIOPSY: CPT

## 2021-11-19 RX ORDER — SODIUM CHLORIDE 9 MG/ML
INJECTION, SOLUTION INTRAVENOUS CONTINUOUS
Status: DISCONTINUED | OUTPATIENT
Start: 2021-11-19 | End: 2021-11-19 | Stop reason: HOSPADM

## 2021-11-19 RX ORDER — LIDOCAINE HYDROCHLORIDE 10 MG/ML
INJECTION, SOLUTION INFILTRATION; PERINEURAL PRN
Status: DISCONTINUED | OUTPATIENT
Start: 2021-11-19 | End: 2021-11-19 | Stop reason: SDUPTHER

## 2021-11-19 RX ORDER — PROPOFOL 10 MG/ML
INJECTION, EMULSION INTRAVENOUS PRN
Status: DISCONTINUED | OUTPATIENT
Start: 2021-11-19 | End: 2021-11-19 | Stop reason: SDUPTHER

## 2021-11-19 RX ADMIN — SODIUM CHLORIDE: 9 INJECTION, SOLUTION INTRAVENOUS at 11:23

## 2021-11-19 RX ADMIN — SODIUM CHLORIDE: 9 INJECTION, SOLUTION INTRAVENOUS at 13:31

## 2021-11-19 RX ADMIN — PROPOFOL 200 MG: 10 INJECTION, EMULSION INTRAVENOUS at 13:03

## 2021-11-19 RX ADMIN — PROPOFOL 200 MG: 10 INJECTION, EMULSION INTRAVENOUS at 13:15

## 2021-11-19 RX ADMIN — PROPOFOL 100 MG: 10 INJECTION, EMULSION INTRAVENOUS at 13:25

## 2021-11-19 RX ADMIN — LIDOCAINE HYDROCHLORIDE 50 MG: 10 INJECTION, SOLUTION INFILTRATION; PERINEURAL at 13:03

## 2021-11-19 NOTE — OP NOTE
Endoscopic Procedure Note    Patient: Zohaib Patel : 1975  Med Rec#: [id number] Acc#: [id number]     Primary Care Provider SHEYLA Long - CNP    Endoscopist: Micheal Rojo MD, MD    Date of Procedure:  2021    Procedure:   1. EGD with cold biopsies    Indications: For both EGD and colonoscopy exams today:  1. Chronic diarrhea -worse since her Covid 19 infections         2. BRBPR (bright red blood per rectum)     3. Chronic anemia     4. History of COVID-19 infections x2  5. Family HX:  Maternal grandfather had colon cancer; paternal grandmother had gastric cancer, mother had colon polyps, father had colon polyps, sister had colon polyps    Anesthesia:  Sedation was administered by anesthesia who monitored the patient during the procedure. Estimated Blood Loss: minimal    Procedure:   After reviewing the patient's chart and obtaining informed consent, the patient was placed in the left lateral decubitus position. A forward-viewing Olympus endoscope was lubricated and inserted through the mouth into the oropharynx. Under direct visualization, the upper esophagus was intubated. The scope was advanced to the level of the third portion of duodenum. Scope was slowly withdrawn with careful inspection of the mucosal surfaces. The scope was retroflexed for inspection of the gastric fundus and incisura. Findings and maneuvers are listed in impression below. The patient tolerated the procedure well. The scope was removed. There were no immediate complications. Findings/IMPRESSION:  Esophagus: normal and a normal EG junction at 39 cm. NO erosions or ulcers or nodules or strictures or webs or rings or mass lesions or extrinsic compression or diverticula noted. There is a small 3 cm sliding hiatal hernia present.       Stomach:  abnormal: Patchy mucosal changes with few small 1 to 2 mm in diameter erosions in the antrum suggestive of mild antral gastritis noted -  Gastric biopsies were taken from the antrum and body to rule out Helicobacter pylori infection. NO ulcers or masses or gastric outlet obstruction or retained food or fluid. Rugae were normal and lumen distended well with insufflation. Retroflexed views otherwise revealed a normal GE junction, fundus and cardia as well. Duodenum: Normal. Random biopsies were taken to check for Celiac disease and other causes of villous atrophy. RECOMMENDATIONS:    1. Await path results, the patient will be contacted in 7-10 days with biopsy results. 2.  Continue anti-GERD measures and use of acid suppression medications    The results were discussed with the patient and family. A copy of the images obtained were given to the patient.      Nicol Mathur MD, MD  11/19/2021  1:02 PM

## 2021-11-19 NOTE — OP NOTE
Patient: Wilmer Díaz : 1975  Med Rec#: [id number] Acc#: [id number]   Primary Care Provider Sheryl Duane, APRN - CNP    Date of Procedure:  2021    Endoscopist: Óscar Erazo MD, MD    Referring Provider: Sheryl Duane, APRN - CNP,     Operation Performed: Colonoscopy up to the terminal ileum with random colon cold biopsies    Indications: For both EGD and colonoscopy exams today:  1. Chronic diarrhea -worse since her Covid 19 infections         2. BRBPR (bright red blood per rectum)     3. Chronic anemia     4. History of COVID-19 infections x2  5. Family HX:  Maternal grandfather had colon cancer; paternal grandmother had gastric cancer, mother had colon polyps, father had colon polyps, sister had colon polyps    Anesthesia:  Sedation was administered by anesthesia who monitored the patient during the procedure. I met with Wilmer Díaz prior to procedure. We discussed the procedure itself, and I have discussed the risks of endoscopy (including-- but not limited to-- pain, discomfort, bleeding potentially requiring second endoscopic procedure and/or blood transfusion, organ perforation requiring operative repair, damage to organs near the colon, infection, aspiration, cardiopulmonary/allergic reaction), benefits, indications to endoscopy. Additionally, we discussed options other than colonoscopy. The patient expressed understanding. All questions answered. The patient decided to proceed with the procedure. Signed informed consent was placed on the chart. Blood Loss: minimal    Withdrawal time: More than 6-minute  Bowel Prep: Fair  with small amounts of thick solid and semisolid stool moderate amount of thick, opaque liquid scattered in patchy segments throughout the colon obscuring the underlying mucosa. Lesions including polyps may have been missed. Complications: no immediate complications    DESCRIPTION OF PROCEDURE:     A time out was performed.  After written informed consent was obtained, the patient was placed in the left lateral position. The perianal area was inspected, and a digital rectal exam was performed. A rectal exam was performed: normal tone, no palpable lesions. At this point, a forward viewing Olympus colonoscope was inserted into the anus and carefully advanced to the terminal ileum with some difficulty requiring external abdominal pressure during parts of this procedure due to a very tortuous and redundant colon. The cecum was identified by the ileocecal valve and the appendiceal orifice. The colonoscope was then slowly withdrawn with careful inspection of the mucosa in a linear and circumferential fashion. The scope was retroflexed in the rectum. Suction was utilized during the procedure to remove as much air as possible from the bowel. The colonoscope was removed from the patient, and the procedure was terminated. Findings are listed below. Findings:     NO large polyps or masses or strictures or colitis. Suboptimal exam due to prep quality as described above. Internal hemorrhoids-Grade 1  Where it was clearly visible, the mucosa appeared normal throughout the entire examined colon. Retroflexion in the rectum was otherwise normal and revealed no further abnormalities. Recommendations:  1. Repeat colonoscopy: pending pathology -in 1 year due to her significant family history and suboptimal prep quality today  2. Await biopsy results-you will receive a letter with your results within 7-10 days    - Resume previous meds and diet  - GI clinic f/u 4-6 weeks. - Keep scheduled f/u appts with other MDs     - NO ASA/NSAIDs x 2 weeks    Findings and recommendations were discussed w/ the patient. A copy of the images was provided.     Charisse Lugo MD, MD  11/19/2021  1:02 PM

## 2021-11-19 NOTE — ANESTHESIA PRE PROCEDURE
Department of Anesthesiology  Preprocedure Note       Name:  Abraham Cortez   Age:  40 y.o.  :  1975                                          MRN:  [id number]         Date:  2021      Surgeon: Mitul Bay):  Nicholas Mares MD    Procedure: Procedure(s):  EGD BIOPSY  COLONOSCOPY DIAGNOSTIC    Medications prior to admission:   Prior to Admission medications    Medication Sig Start Date End Date Taking? Authorizing Provider   loratadine (CLARITIN) 10 MG tablet Take 10 mg by mouth as needed   Yes Historical Provider, MD   cetirizine (ZYRTEC) 10 MG tablet TK 1 T PO  BID 20  Yes Historical Provider, MD   acetaminophen (TYLENOL) 325 MG tablet Take 650 mg by mouth every 6 hours as needed for Pain   Yes Historical Provider, MD   ibuprofen (ADVIL;MOTRIN) 200 MG tablet Take 800 mg by mouth every 6 hours as needed for Pain    Yes Historical Provider, MD       Current medications:    Current Facility-Administered Medications   Medication Dose Route Frequency Provider Last Rate Last Admin    0.9 % sodium chloride infusion   IntraVENous Continuous Nicholas Mares  mL/hr at 21 1123 New Bag at 21 1123    0.9 % sodium chloride infusion   IntraVENous Continuous Nicholas Mares MD           Allergies:     Allergies   Allergen Reactions    Sulfa Antibiotics Hives and Itching    Bactrim [Sulfamethoxazole-Trimethoprim] Hives, Itching and Rash    Pcn [Penicillins] Rash     Childhood allergy       Problem List:    Patient Active Problem List   Diagnosis Code    Breast pain N64.4    Dense breasts R92.2    Lump or mass in breast N63.0    Diffuse cystic mastopathy N60.19    S/P bilateral mastectomy 2017 Z90.13    Chronic diarrhea K52.9    BRBPR (bright red blood per rectum) K62.5    Chronic anemia D64.9       Past Medical History:        Diagnosis Date    Abnormal Pap smear of cervix     Dense breasts 2016    Irritable bowel syndrome        Past Surgical History: Procedure Laterality Date    BLADDER REPAIR  stent    7 YR. OLD    BREAST SURGERY  07/15/2016    Bilateral cyst aspiration & Biopsies; LOCAL/ORAL SEDATION    COLONOSCOPY  1988    Dx IBS per patient    EGD  [address] [place] N/A 11/21/2017    BREAST MASTECTOMY BILATERAL WITH IMMEDIATE RECONSTRUCTION performed by Roseann Rivero MD at [address] [place], [place] [address] Place  2004       Social History:    Social History     Tobacco Use    Smoking status: Current Every Day Smoker     Packs/day: 0.25     Years: 25.00     Pack years: 6.25    Smokeless tobacco: Never Used   Substance Use Topics    Alcohol use: Yes     Comment: occ                                Ready to quit: Not Answered  Counseling given: Not Answered      Vital Signs (Current):   Vitals:    11/19/21 1118   BP: 100/60   Pulse: 87   Resp: 20   SpO2: 97%   Weight: 111 lb (50.3 kg)   Height: 5' 7\" (1.702 m)                                              BP Readings from Last 3 Encounters:   11/19/21 100/60   10/21/21 (!) 92/54   10/22/20 110/60       NPO Status: Time of last liquid consumption: 0930                        Time of last solid consumption: 2000                        Date of last liquid consumption: 11/19/21                        Date of last solid food consumption: 11/17/21    BMI:   Wt Readings from Last 3 Encounters:   11/19/21 111 lb (50.3 kg)   10/21/21 110 lb 12.8 oz (50.3 kg)   11/01/18 111 lb (50.3 kg)     Body mass index is 17.39 kg/m². CBC:   Lab Results   Component Value Date    WBC 7.8 11/13/2017    RBC 4.34 11/13/2017    HGB 13.6 11/13/2017    HCT 40.5 11/13/2017    MCV 93.3 11/13/2017    RDW 11.6 11/13/2017     11/13/2017       CMP: No results found for: NA, K, CL, CO2, BUN, CREATININE, GFRAA, AGRATIO, LABGLOM, GLUCOSE, PROT, CALCIUM, BILITOT, ALKPHOS, AST, ALT    POC Tests: No results for input(s): POCGLU, POCNA, POCK, POCCL, POCBUN, POCHEMO, POCHCT in the last 72 hours.     Coags: No results found for: PROTIME, INR, APTT    HCG (If Applicable):   Lab Results   Component Value Date    PREGTESTUR Negative 11/21/2017        ABGs: No results found for: PHART, PO2ART, KDF3YIO, JUQ9UWD, BEART, U3PENCYF     Type & Screen (If Applicable):  No results found for: LABABO, LABRH    Drug/Infectious Status (If Applicable):  No results found for: HIV, HEPCAB    COVID-19 Screening (If Applicable): No results found for: COVID19        Anesthesia Evaluation  Patient summary reviewed and Nursing notes reviewed  Airway: Mallampati: II  TM distance: >3 FB   Neck ROM: full  Mouth opening: > = 3 FB Dental: normal exam         Pulmonary:Negative Pulmonary ROS and normal exam                               Cardiovascular:Negative CV ROS  Exercise tolerance: good (>4 METS),            Beta Blocker:  Not on Beta Blocker         Neuro/Psych:   Negative Neuro/Psych ROS              GI/Hepatic/Renal: Neg GI/Hepatic/Renal ROS            Endo/Other: Negative Endo/Other ROS                    Abdominal:             Vascular: negative vascular ROS. Other Findings:             Anesthesia Plan      general and TIVA     ASA 1       Induction: intravenous. Anesthetic plan and risks discussed with patient. Plan discussed with CRNA.                   SHEYLA Delgado - RADHA   11/19/2021

## 2021-11-29 PROCEDURE — 45380 COLONOSCOPY AND BIOPSY: CPT

## 2021-11-29 PROCEDURE — 43239 EGD BIOPSY SINGLE/MULTIPLE: CPT

## 2021-12-24 NOTE — PROGRESS NOTES
HISTORY OF PRESENT ILLNESS:  Ms. Marcus eNal  is a 40 y.o.   female who presents today for an annual breast exam.    She underwent bilateral mastectomy and prepectoral reconstruction on 11/21/17. Pathology showed no evidence of malignancy. Procedure was done for chronic ongoing breast pain. She had immediate reconstruction with permanent implant placement      Unilateral Left Ultrasound-12/11/2017  Report: Sonographic images were obtained in the right breast in the    4:00 region, demonstrating a small pocket of fluid adjacent to the    breast implant, measuring approximately 1.1 x 1.0 cm. The fluid is    uncomplicated and most likely represents a small seroma.         Impression    1.1 x 1.0 cm probable seroma adjacent to the right breast    implant at 4:00. Signed by Dr Dio aGmble. Leni on 12/11/2017 4:52 PM      She is doing quite well . Her breast pain has resolved. She has minimal tenderness but is back to her usual level of activity and work    Her biggest stresser most recently is that her daughter was shot in the knee during the [employer] shootings. She is doing fine but it has been a very traumatic experience for their whole family. Daughter spent spring break(y in [place] so she seemed to be making progress. She was also mildly ill with Covid last January. Prior to its first case in the [address] [place] [address] [address]. 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 Her only complaint at this time is some pulling and pain in the right lateral aspect of her implant. It only really hurts when she is reaches high above her head. We will try some Voltaren gel and some ibuprofen. She may need an injection of Marcaine and Decadron. PHYSICAL EXAM:  The  wounds look good with no evidence of infection, fluid accumulation, or skin necrosis. She has gained about 5 pounds and this has really smoothed out the edges all the way around her implants.   She looks great and is quite pleased. IMPRESSION:    Doing well s/p  bilateral mastectomy and reconstruction    PLAN: Return in one year for a physical exam. She will call with any concerns or changes. I have seen, examined and reviewed this patient medication list, appropriate labs and imaging studies. I reviewed relevant medical records and others physicians notes. I discussed the plans of care with the patient. I answered all the questions to the patients satisfaction. I, Dr Alysia Paige, personally performed the services described in this documentation as scribed by David Lang MA in my presence and is both accurate and complete. (Please note that portions of this note were completed with a voice recognition program. Efforts were made to edit the dictations but occasionally words are mis-transcribed.)  Over 50% of the total visit time of 15 minutes in face to face encounter with the patient, out of which more than 50% of the time was spent in counseling patient or family and coordination of care. Counseling included but was not limited to time spent reviewing labs, imaging studies/ treatment plan and answering questions.

## 2021-12-27 ENCOUNTER — OFFICE VISIT (OUTPATIENT)
Dept: SURGERY | Age: 46
End: 2021-12-27
Payer: MEDICAID

## 2021-12-27 VITALS
BODY MASS INDEX: 17.58 KG/M2 | OXYGEN SATURATION: 98 % | WEIGHT: 112 LBS | HEART RATE: 70 BPM | TEMPERATURE: 98 F | HEIGHT: 67 IN

## 2021-12-27 DIAGNOSIS — Z90.13 S/P BILATERAL MASTECTOMY: Primary | ICD-10-CM

## 2021-12-27 PROCEDURE — 99213 OFFICE O/P EST LOW 20 MIN: CPT | Performed by: SURGERY

## 2021-12-27 NOTE — Clinical Note
1 year for physical exam Sooner if she continues to have pain and wants to consider the Decadron/Marcaine injection

## 2023-01-03 ENCOUNTER — OFFICE VISIT (OUTPATIENT)
Dept: SURGERY | Age: 48
End: 2023-01-03

## 2023-01-03 VITALS — DIASTOLIC BLOOD PRESSURE: 70 MMHG | SYSTOLIC BLOOD PRESSURE: 100 MMHG | HEART RATE: 76 BPM

## 2023-01-03 DIAGNOSIS — Z90.13 S/P BILATERAL MASTECTOMY: Primary | ICD-10-CM

## 2023-01-03 NOTE — PROGRESS NOTES
HISTORY OF PRESENT ILLNESS:  Ms. Pat Huitron  is a 56 y.o.   female who presents today for an annual breast exam.    She underwent bilateral mastectomy and prepectoral reconstruction on 11/21/17. Pathology showed no evidence of malignancy. Procedure was done for chronic ongoing breast pain. She had immediate reconstruction with permanent implant placement      Unilateral Left Ultrasound-12/11/2017  Report: Sonographic images were obtained in the right breast in the    4:00 region, demonstrating a small pocket of fluid adjacent to the    breast implant, measuring approximately 1.1 x 1.0 cm. The fluid is    uncomplicated and most likely represents a small seroma. Impression    1.1 x 1.0 cm probable seroma adjacent to the right breast    implant at 4:00. Signed by Dr Denisse Mariano. Leni on 12/11/2017 4:52 PM      She is doing quite well . Her breast pain has resolved. She has minimal tenderness but is back to her usual level of activity and work    Her biggest stresser most recently is that her daughter was shot in the knee during the [employer] shootings. She is doing fine but it has been a very traumatic experience for their whole family. Daughter spent spring break(y in [place] so she seemed to be making progress. She was also mildly ill with Covid last January. Prior to its first case in the [address] [place] [address]. 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 Her only complaint at this time is some pulling and pain in the right lateral aspect of her implant. It only really hurts when she is reaches high above her head. We will try some Voltaren gel and some ibuprofen. She may need an injection of Marcaine and Decadron. PHYSICAL EXAM:  The  wounds look good with no evidence of infection, fluid accumulation, or skin necrosis. She has gained about 5 pounds and this has really smoothed out the edges all the way around her implants.   She looks great and is quite pleased. IMPRESSION:    Doing well s/p  bilateral mastectomy and reconstruction    PLAN: Follow-up 1 year for physical exam      I have seen, examined and reviewed this patient medication list, appropriate labs and imaging studies. I reviewed relevant medical records and others physicians notes. I discussed the plans of care with the patient. I answered all the questions to the patients satisfaction. I, Dr Jameson Santos, personally performed the services described in this documentation as scribed by Bebeto Heart MA in my presence and is both accurate and complete. (Please note that portions of this note were completed with a voice recognition program. Efforts were made to edit the dictations but occasionally words are mis-transcribed.)  Over 50% of the total visit time of 20 minutes in face to face encounter with the patient, out of which more than 50% of the time was spent in counseling patient or family and coordination of care. Counseling included but was not limited to time spent reviewing labs, imaging studies/ treatment plan and answering questions.

## 2023-11-03 ENCOUNTER — OFFICE VISIT (OUTPATIENT)
Dept: OBSTETRICS AND GYNECOLOGY | Facility: CLINIC | Age: 48
End: 2023-11-03
Payer: MEDICAID

## 2023-11-03 VITALS
DIASTOLIC BLOOD PRESSURE: 74 MMHG | HEIGHT: 67 IN | WEIGHT: 106 LBS | SYSTOLIC BLOOD PRESSURE: 100 MMHG | BODY MASS INDEX: 16.64 KG/M2

## 2023-11-03 DIAGNOSIS — Z90.13 HISTORY OF MASTECTOMY, BILATERAL: ICD-10-CM

## 2023-11-03 DIAGNOSIS — Z12.4 CERVICAL CANCER SCREENING: ICD-10-CM

## 2023-11-03 DIAGNOSIS — Z01.419 WELL WOMAN EXAM WITH ROUTINE GYNECOLOGICAL EXAM: Primary | ICD-10-CM

## 2023-11-03 PROCEDURE — G0123 SCREEN CERV/VAG THIN LAYER: HCPCS | Performed by: NURSE PRACTITIONER

## 2023-11-03 PROCEDURE — 87624 HPV HI-RISK TYP POOLED RSLT: CPT | Performed by: NURSE PRACTITIONER

## 2023-11-03 RX ORDER — ALBUTEROL SULFATE 90 UG/1
AEROSOL, METERED RESPIRATORY (INHALATION)
COMMUNITY
Start: 2023-10-12

## 2023-11-03 RX ORDER — LORATADINE 10 MG/1
10 TABLET ORAL
COMMUNITY

## 2023-11-03 NOTE — PROGRESS NOTES
"Chief Complaint   Patient presents with    Gynecologic Exam     Patient here for annual, last pap 3/11/19 WNL, patient had double mastectomy 11/2017, patient thinks she has bladder prolapse.         Subjective     Angelina Rodriguez is a 48 y.o. female    History of Present Illness  Pt comes in today for annual wellness. Denies any significant complaints. Wants to make sure bladder hasn't prolapsed as it sometimes feels different. Pt having less frequent periods, but not menopausal. Had history of leep in 1999.     /74 (BP Location: Left arm, Patient Position: Sitting, Cuff Size: Adult)   Ht 170.2 cm (67\")   Wt 48.1 kg (106 lb)   LMP 04/05/2023   BMI 16.60 kg/m²     Outpatient Encounter Medications as of 11/3/2023   Medication Sig Dispense Refill    ibuprofen (ADVIL,MOTRIN) 200 MG tablet Take 4 tablets by mouth.      loratadine (CLARITIN) 10 MG tablet Take 1 tablet by mouth.      Ventolin  (90 Base) MCG/ACT inhaler TAKE 1 PUFF (INHALATION) EVERY 6 HOURS (SHORTNESS OF BREATH OR WHEEZING)      vitamin C (ASCORBIC ACID) 500 MG tablet Take 1 tablet by mouth.      [DISCONTINUED] medroxyPROGESTERone (PROVERA) 10 MG tablet Take 1 tablet by mouth Daily. 7 tablet 3     No facility-administered encounter medications on file as of 11/3/2023.       Past Medical History  Past Medical History:   Diagnosis Date    Abnormal Pap smear of cervix     Fibrocystic breast         Surgical History  Past Surgical History:   Procedure Laterality Date    BREAST RECONSTRUCTION      CERVICAL BIOPSY  W/ LOOP ELECTRODE EXCISION      1999    MASTECTOMY Bilateral     done for family history and fibrocystic breasts    TUBAL ABDOMINAL LIGATION         Family History  Family History   Problem Relation Age of Onset    Cervical cancer Mother     Breast cancer Maternal Aunt     Breast cancer Paternal Aunt     Breast cancer Cousin        The following portions of the patient's history were reviewed and updated as appropriate: allergies, " current medications, past family history, past medical history, past social history, past surgical history, and problem list.    Review of Systems   Constitutional:  Negative for activity change and unexpected weight loss.   HENT:  Negative for congestion.    Cardiovascular:  Negative for chest pain.   Gastrointestinal:  Negative for blood in stool, constipation and diarrhea.   Endocrine: Negative for cold intolerance and heat intolerance.   Genitourinary:  Positive for menstrual problem. Negative for dyspareunia, pelvic pain and vaginal discharge.   Musculoskeletal:  Negative for arthralgias, back pain, neck pain and neck stiffness.   Skin:  Negative for rash.   Neurological:  Negative for dizziness and headache.   Psychiatric/Behavioral:  Negative for sleep disturbance. The patient is not nervous/anxious.        Objective   Physical Exam  Vitals and nursing note reviewed. Exam conducted with a chaperone present.   Constitutional:       General: She is not in acute distress.     Appearance: She is well-developed. She is not diaphoretic.   HENT:      Head: Normocephalic.      Right Ear: External ear normal.      Left Ear: External ear normal.      Nose: Nose normal.   Eyes:      General: No scleral icterus.        Right eye: No discharge.         Left eye: No discharge.      Conjunctiva/sclera: Conjunctivae normal.      Pupils: Pupils are equal, round, and reactive to light.   Neck:      Thyroid: No thyromegaly.      Vascular: No carotid bruit.      Trachea: No tracheal deviation.   Cardiovascular:      Rate and Rhythm: Normal rate and regular rhythm.      Heart sounds: Normal heart sounds. No murmur heard.  Pulmonary:      Effort: Pulmonary effort is normal. No respiratory distress.      Breath sounds: Normal breath sounds. No wheezing.   Chest:   Breasts:     Breasts are symmetrical.      Right: Normal. No swelling, bleeding, inverted nipple, mass, nipple discharge, skin change or tenderness.      Left: Normal. No  swelling, bleeding, inverted nipple, mass, nipple discharge, skin change or tenderness.   Abdominal:      General: There is no distension.      Palpations: Abdomen is soft. There is no mass.      Tenderness: There is no abdominal tenderness. There is no right CVA tenderness, left CVA tenderness or guarding.      Hernia: No hernia is present. There is no hernia in the left inguinal area or right inguinal area.   Genitourinary:     General: Normal vulva.      Exam position: Lithotomy position.      Labia:         Right: No rash, tenderness, lesion or injury.         Left: No rash, tenderness, lesion or injury.       Vagina: Normal. No signs of injury and foreign body. No vaginal discharge, erythema, tenderness or bleeding.      Cervix: Normal.      Uterus: Normal. Not enlarged, not fixed and not tender.       Adnexa: Right adnexa normal and left adnexa normal.        Right: No mass, tenderness or fullness.          Left: No mass, tenderness or fullness.        Rectum: Normal. No mass.      Comments:   BSU normal  Urethral meatus  Normal  Perineum  Normal  Musculoskeletal:         General: No tenderness. Normal range of motion.      Cervical back: Normal range of motion and neck supple.   Lymphadenopathy:      Head:      Right side of head: No submental, submandibular, tonsillar, preauricular, posterior auricular or occipital adenopathy.      Left side of head: No submental, submandibular, tonsillar, preauricular, posterior auricular or occipital adenopathy.      Cervical: No cervical adenopathy.      Right cervical: No superficial, deep or posterior cervical adenopathy.     Left cervical: No superficial, deep or posterior cervical adenopathy.      Upper Body:      Right upper body: No supraclavicular, axillary or pectoral adenopathy.      Left upper body: No supraclavicular, axillary or pectoral adenopathy.      Lower Body: No right inguinal adenopathy. No left inguinal adenopathy.   Skin:     General: Skin is warm  and dry.      Findings: No bruising, erythema or rash.   Neurological:      Mental Status: She is alert and oriented to person, place, and time.      Coordination: Coordination normal.   Psychiatric:         Mood and Affect: Mood normal.         Behavior: Behavior normal.         Thought Content: Thought content normal.         Judgment: Judgment normal.         Assessment & Plan   Diagnoses and all orders for this visit:    1. Well woman exam with routine gynecological exam (Primary)    2. Cervical cancer screening  -     Liquid-based Pap Smear, Screening    3. History of mastectomy, bilateral         Normal GYN exam. Encouraged SBE, pt is aware how to do self breast exam and the importance of same. Discussed weight management and importance of maintaining a healthy weight. Discussed Vitamin D intake and the importance of adequate vitamin D for both bone health and a healthy immune system.  Discussed daily exercise and the importance of same, in regards to a healthy heart as well as helping to maintain her weight and improving her mental health.  Colonoscopy is up to date. Mammogram is not indicated. Bone density is not indicated. Pap smear is done per ASCCP guidelines. HPV is done. Lab work up is up to date through PCP.      Pt comes in for annual wellness. No longer has to get mammograms due to mastectomy for prophylactic reasons. Follows with surgeon for this.   Pt has no significant signs of prolapse on exam today.   Pt has history of LEEP. Pap collected.     Rayne Lopez, MARISA  11/3/2023    Return in about 1 year (around 11/3/2024) for Annual physical.

## 2023-11-08 LAB
GEN CATEG CVX/VAG CYTO-IMP: NORMAL
HPV I/H RISK 4 DNA CVX QL PROBE+SIG AMP: NOT DETECTED
LAB AP CASE REPORT: NORMAL
LAB AP GYN ADDITIONAL INFORMATION: NORMAL
LAB AP GYN OTHER FINDINGS: NORMAL
Lab: NORMAL
PATH INTERP SPEC-IMP: NORMAL
STAT OF ADQ CVX/VAG CYTO-IMP: NORMAL

## 2023-12-26 NOTE — PROGRESS NOTES
HISTORY OF PRESENT ILLNESS:  Ms. Meier  is a 48 y.o.    female who presents today for an annual breast exam following bilateral mastectomy and prepectoral reconstruction on 11/21/17. Pathology showed no evidence of malignancy. Procedure was done for chronic ongoing breast pain. She had immediate reconstruction with permanent implant placement      Unilateral Left Ultrasound-12/11/2017  Report: Sonographic images were obtained in the right breast in the    4:00 region, demonstrating a small pocket of fluid adjacent to the    breast implant, measuring approximately 1.1 x 1.0 cm. The fluid is    uncomplicated and most likely represents a small seroma.         Impression    1.1 x 1.0 cm probable seroma adjacent to the right breast    implant at 4:00.    Signed by Dr Eulogio Farrar on 12/11/2017 4:52 PM      She is doing quite well .  Her breast pain has resolved. She has minimal tenderness but is back to her usual level of activity and work    Her biggest stresser most recently is that her daughter was shot in the knee during the [facility] shootings. She is doing fine but it has been a very traumatic experience for their whole family.  Daughter spent spring break(y in [place] so she seemed to be making progress.    She was also mildly ill with Covid last January.  Prior to its first case in the [place].  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    Her only complaint at this time is some pulling and pain in the right lateral aspect of her implant.  It only really hurts when she is reaches high above her head.  We will try some Voltaren gel and some ibuprofen.  She may need an injection of Marcaine and Decadron.      PHYSICAL EXAM:  The  wounds look good with no evidence of infection, fluid accumulation, or skin necrosis.  She has gained about 5 pounds and this has really smoothed out the edges all the way around her implants.  She looks great and is quite

## 2024-02-28 ENCOUNTER — OFFICE VISIT (OUTPATIENT)
Dept: SURGERY | Age: 49
End: 2024-02-28

## 2024-02-28 VITALS — WEIGHT: 109 LBS | HEIGHT: 67 IN | HEART RATE: 72 BPM | BODY MASS INDEX: 17.11 KG/M2

## 2024-02-28 DIAGNOSIS — Z90.13 S/P BILATERAL MASTECTOMY: Primary | ICD-10-CM

## 2024-11-08 ENCOUNTER — OFFICE VISIT (OUTPATIENT)
Dept: OBSTETRICS AND GYNECOLOGY | Age: 49
End: 2024-11-08
Payer: MEDICAID

## 2024-11-08 VITALS
SYSTOLIC BLOOD PRESSURE: 98 MMHG | DIASTOLIC BLOOD PRESSURE: 64 MMHG | WEIGHT: 105 LBS | HEIGHT: 67 IN | BODY MASS INDEX: 16.48 KG/M2

## 2024-11-08 DIAGNOSIS — Z12.4 ENCOUNTER FOR SCREENING FOR CERVICAL CANCER: ICD-10-CM

## 2024-11-08 DIAGNOSIS — Z90.13 HISTORY OF MASTECTOMY, BILATERAL: ICD-10-CM

## 2024-11-08 DIAGNOSIS — Z01.419 WELL WOMAN EXAM WITH ROUTINE GYNECOLOGICAL EXAM: Primary | ICD-10-CM

## 2024-11-08 DIAGNOSIS — Z87.42 HISTORY OF ABNORMAL CERVICAL PAP SMEAR: ICD-10-CM

## 2024-11-08 PROCEDURE — 99396 PREV VISIT EST AGE 40-64: CPT | Performed by: NURSE PRACTITIONER

## 2024-11-08 PROCEDURE — 99459 PELVIC EXAMINATION: CPT | Performed by: NURSE PRACTITIONER

## 2024-11-08 PROCEDURE — 1160F RVW MEDS BY RX/DR IN RCRD: CPT | Performed by: NURSE PRACTITIONER

## 2024-11-08 PROCEDURE — 87624 HPV HI-RISK TYP POOLED RSLT: CPT | Performed by: NURSE PRACTITIONER

## 2024-11-08 PROCEDURE — G0123 SCREEN CERV/VAG THIN LAYER: HCPCS | Performed by: NURSE PRACTITIONER

## 2024-11-08 PROCEDURE — 1159F MED LIST DOCD IN RCRD: CPT | Performed by: NURSE PRACTITIONER

## 2024-11-08 RX ORDER — ACETAMINOPHEN 325 MG/1
2 TABLET ORAL EVERY 6 HOURS PRN
COMMUNITY

## 2024-11-12 NOTE — PROGRESS NOTES
"Chief Complaint   Patient presents with    Gynecologic Exam     Patient here for annual, last pap 11/3/23, patient follows with . Double mastectomy.   Patient denies any problems or concerns.         Subjective     Angelina Rodriguez is a 49 y.o. female    History of Present Illness  Pt comes in today for annual wellness exam. Denies any problems.     BP 98/64 (BP Location: Left arm, Patient Position: Sitting, Cuff Size: Adult)   Ht 170.2 cm (67\")   Wt 47.6 kg (105 lb)   LMP 04/01/2023   BMI 16.45 kg/m²     Outpatient Encounter Medications as of 11/8/2024   Medication Sig Dispense Refill    acetaminophen (TYLENOL) 325 MG tablet Take 2 tablets by mouth Every 6 (Six) Hours As Needed.      ibuprofen (ADVIL,MOTRIN) 200 MG tablet Take 4 tablets by mouth.      loratadine (CLARITIN) 10 MG tablet Take 1 tablet by mouth.      [DISCONTINUED] Ventolin  (90 Base) MCG/ACT inhaler TAKE 1 PUFF (INHALATION) EVERY 6 HOURS (SHORTNESS OF BREATH OR WHEEZING)      [DISCONTINUED] vitamin C (ASCORBIC ACID) 500 MG tablet Take 1 tablet by mouth.       No facility-administered encounter medications on file as of 11/8/2024.       Past Medical History  Past Medical History:   Diagnosis Date    Abnormal Pap smear of cervix     Fibrocystic breast         Surgical History  Past Surgical History:   Procedure Laterality Date    BREAST RECONSTRUCTION      CERVICAL BIOPSY  W/ LOOP ELECTRODE EXCISION      1999    MASTECTOMY Bilateral     done for family history and fibrocystic breasts    TUBAL ABDOMINAL LIGATION         Family History  Family History   Problem Relation Age of Onset    Cervical cancer Mother     Breast cancer Maternal Aunt     Breast cancer Paternal Aunt     Breast cancer Cousin        The following portions of the patient's history were reviewed and updated as appropriate: allergies, current medications, past family history, past medical history, past social history, past surgical history, and problem list.    Review of " Systems   Constitutional:  Negative for activity change and unexpected weight loss.   Cardiovascular:  Negative for chest pain.   Gastrointestinal:  Negative for blood in stool, constipation and diarrhea.   Endocrine: Negative for cold intolerance and heat intolerance.   Genitourinary:  Negative for dyspareunia, pelvic pain and vaginal discharge.   Musculoskeletal:  Negative for arthralgias, back pain, neck pain and neck stiffness.   Skin:  Negative for rash.   Neurological:  Negative for dizziness and headache.   Psychiatric/Behavioral:  Negative for sleep disturbance. The patient is not nervous/anxious.        Objective   Physical Exam  Vitals and nursing note reviewed. Exam conducted with a chaperone present.   Constitutional:       General: She is not in acute distress.     Appearance: She is well-developed. She is not diaphoretic.   HENT:      Head: Normocephalic.      Right Ear: External ear normal.      Left Ear: External ear normal.      Nose: Nose normal.   Eyes:      General: No scleral icterus.        Right eye: No discharge.         Left eye: No discharge.      Conjunctiva/sclera: Conjunctivae normal.      Pupils: Pupils are equal, round, and reactive to light.   Neck:      Thyroid: No thyromegaly.      Vascular: No carotid bruit.      Trachea: No tracheal deviation.   Cardiovascular:      Rate and Rhythm: Normal rate and regular rhythm.      Heart sounds: Normal heart sounds. No murmur heard.  Pulmonary:      Effort: Pulmonary effort is normal. No respiratory distress.      Breath sounds: Normal breath sounds. No wheezing.   Chest:   Breasts:     Breasts are symmetrical.      Right: Normal. No swelling, bleeding, inverted nipple, mass, nipple discharge, skin change or tenderness.      Left: Normal. No swelling, bleeding, inverted nipple, mass, nipple discharge, skin change or tenderness.   Abdominal:      General: There is no distension.      Palpations: Abdomen is soft. There is no mass.       Tenderness: There is no abdominal tenderness. There is no right CVA tenderness, left CVA tenderness or guarding.      Hernia: No hernia is present. There is no hernia in the left inguinal area or right inguinal area.   Genitourinary:     General: Normal vulva.      Exam position: Lithotomy position.      Labia:         Right: No rash, tenderness, lesion or injury.         Left: No rash, tenderness, lesion or injury.       Vagina: Normal. No signs of injury and foreign body. No vaginal discharge, erythema, tenderness or bleeding.      Cervix: Normal.      Uterus: Normal. Not enlarged, not fixed and not tender.       Adnexa: Right adnexa normal and left adnexa normal.        Right: No mass, tenderness or fullness.          Left: No mass, tenderness or fullness.        Rectum: Normal. No mass.      Comments:   BSU normal  Urethral meatus  Normal  Perineum  Normal  Musculoskeletal:         General: No tenderness. Normal range of motion.      Cervical back: Normal range of motion and neck supple.   Lymphadenopathy:      Head:      Right side of head: No submental, submandibular, tonsillar, preauricular, posterior auricular or occipital adenopathy.      Left side of head: No submental, submandibular, tonsillar, preauricular, posterior auricular or occipital adenopathy.      Cervical: No cervical adenopathy.      Right cervical: No superficial, deep or posterior cervical adenopathy.     Left cervical: No superficial, deep or posterior cervical adenopathy.      Upper Body:      Right upper body: No supraclavicular, axillary or pectoral adenopathy.      Left upper body: No supraclavicular, axillary or pectoral adenopathy.      Lower Body: No right inguinal adenopathy. No left inguinal adenopathy.   Skin:     General: Skin is warm and dry.      Findings: No bruising, erythema or rash.   Neurological:      Mental Status: She is alert and oriented to person, place, and time.      Coordination: Coordination normal.    Psychiatric:         Mood and Affect: Mood normal.         Behavior: Behavior normal.         Thought Content: Thought content normal.         Judgment: Judgment normal.            Assessment & Plan   Diagnoses and all orders for this visit:    1. Well woman exam with routine gynecological exam (Primary)    2. Encounter for screening for cervical cancer  -     Liquid-based Pap Smear, Screening  -     HPV DNA Probe, Direct - ThinPrep Vial, Cervix, Endocervix    3. History of mastectomy, bilateral    4. History of abnormal cervical Pap smear         Normal GYN exam. Encouraged SBE, pt is aware how to do self breast exam and the importance of same. Discussed weight management and importance of maintaining a healthy weight. Discussed Vitamin D intake and the importance of adequate vitamin D for both bone health and a healthy immune system.  Discussed daily exercise and the importance of same, in regards to a healthy heart as well as helping to maintain her weight and improving her mental health.  Colonoscopy is up to date. Mammogram is not indicated. Bone density is not indicated. Pap smear is done per pt request. ASCCP guidelines discussed. HPV is done. Lab work up is up to date with PCP.    Pt continues to follow with DR. Sinclair due to prophylactic mastectomy. No mammograms indicated per pt.      Rayne Lopez, APRN  11/11/2024    Return in about 1 year (around 11/8/2025) for Annual physical.

## 2025-02-25 NOTE — PROGRESS NOTES
accumulation, or skin necrosis.  She has gained about 5 pounds and this has really smoothed out the edges all the way around her implants.  She looks great and is quite pleased.    IMPRESSION:    Doing well s/p  bilateral mastectomy and reconstruction  Swelling left clavicle    PLAN: CT chest to evaluate left clavicle.  She will call us for results.  If negative we will see her in 1 year for follow-up      I have seen, examined and reviewed this patient medication list, appropriate labs and imaging studies. I reviewed relevant medical records and others physician’s notes. I discussed the plans of care with the patient. I answered all the questions to the patient’s satisfaction.  I, Dr Pete Orlando, personally performed the services described in this documentation as scribed by Sruthi Stone MA in my presence and is both accurate and complete.     (Please note that portions of this note were completed with a voice recognition program. Efforts were made to edit the dictations but occasionally words are mis-transcribed.)  Over 50% of the total visit time of 20 minutes in face to face encounter with the patient, out of which more than 50% of the time was spent in counseling patient or family and coordination of care. Counseling included but was not limited to time spent reviewing labs, imaging studies/ treatment plan and answering questions.

## 2025-02-26 ENCOUNTER — OFFICE VISIT (OUTPATIENT)
Dept: SURGERY | Age: 50
End: 2025-02-26

## 2025-02-26 VITALS — HEART RATE: 74 BPM | BODY MASS INDEX: 16.55 KG/M2 | OXYGEN SATURATION: 97 % | WEIGHT: 103 LBS | HEIGHT: 66 IN

## 2025-02-26 DIAGNOSIS — N63.20 MASS OF LEFT BREAST, UNSPECIFIED QUADRANT: ICD-10-CM

## 2025-02-26 DIAGNOSIS — Z90.13 S/P BILATERAL MASTECTOMY: Primary | ICD-10-CM

## 2025-02-26 RX ORDER — MULTIVIT-MIN/IRON/FOLIC ACID/K 18-600-40
2000 CAPSULE ORAL DAILY
COMMUNITY

## 2025-03-04 DIAGNOSIS — M79.89 SWELLING OF CLAVICULAR REGION: Primary | ICD-10-CM

## 2025-03-16 ENCOUNTER — APPOINTMENT (OUTPATIENT)
Dept: GENERAL RADIOLOGY | Age: 50
End: 2025-03-16
Payer: COMMERCIAL

## 2025-03-16 ENCOUNTER — HOSPITAL ENCOUNTER (EMERGENCY)
Age: 50
Discharge: HOME | End: 2025-03-16
Payer: COMMERCIAL

## 2025-03-16 VITALS
RESPIRATION RATE: 20 BRPM | WEIGHT: 108 LBS | HEART RATE: 67 BPM | BODY MASS INDEX: 17.43 KG/M2 | OXYGEN SATURATION: 98 % | DIASTOLIC BLOOD PRESSURE: 60 MMHG | TEMPERATURE: 98.2 F | SYSTOLIC BLOOD PRESSURE: 108 MMHG

## 2025-03-16 DIAGNOSIS — M25.562 ACUTE PAIN OF LEFT KNEE: Primary | ICD-10-CM

## 2025-03-16 PROCEDURE — 73560 X-RAY EXAM OF KNEE 1 OR 2: CPT

## 2025-03-16 PROCEDURE — 99283 EMERGENCY DEPT VISIT LOW MDM: CPT

## 2025-03-16 ASSESSMENT — PAIN DESCRIPTION - LOCATION: LOCATION: KNEE

## 2025-03-16 ASSESSMENT — PAIN DESCRIPTION - DESCRIPTORS: DESCRIPTORS: ACHING

## 2025-03-16 ASSESSMENT — PAIN - FUNCTIONAL ASSESSMENT
PAIN_FUNCTIONAL_ASSESSMENT: 0-10
PAIN_FUNCTIONAL_ASSESSMENT: PREVENTS OR INTERFERES SOME ACTIVE ACTIVITIES AND ADLS

## 2025-03-16 ASSESSMENT — PAIN DESCRIPTION - ORIENTATION: ORIENTATION: LEFT

## 2025-03-16 ASSESSMENT — PAIN DESCRIPTION - PAIN TYPE: TYPE: ACUTE PAIN

## 2025-03-16 ASSESSMENT — PAIN SCALES - GENERAL: PAINLEVEL_OUTOF10: 10

## 2025-03-16 NOTE — ED PROVIDER NOTES
signed)  Attending Emergency Physician            Lauren Alfonso APRN - CNP  03/16/25 1529       Lauren Alfonso APRN - CNP  03/16/25 1529

## 2025-03-17 NOTE — PROGRESS NOTES
ARNOLDO Graves Disclaimer:   This note was dictated with voice recognition software.  Though review and corrections are routine, we apologize for any errors.

## 2025-03-18 ENCOUNTER — HOSPITAL ENCOUNTER (OUTPATIENT)
Dept: CT IMAGING | Age: 50
Discharge: HOME | End: 2025-03-18
Payer: COMMERCIAL

## 2025-03-18 ENCOUNTER — OFFICE VISIT (OUTPATIENT)
Age: 50
End: 2025-03-18
Payer: MEDICAID

## 2025-03-18 VITALS — HEIGHT: 66 IN | BODY MASS INDEX: 17.36 KG/M2 | WEIGHT: 108 LBS

## 2025-03-18 DIAGNOSIS — M25.562 ACUTE PAIN OF LEFT KNEE: Primary | ICD-10-CM

## 2025-03-18 DIAGNOSIS — S83.002A SUBLUXATION OF LEFT PATELLA, INITIAL ENCOUNTER: ICD-10-CM

## 2025-03-18 DIAGNOSIS — Z02.6 ENCOUNTER RELATED TO WORKER'S COMPENSATION CLAIM: ICD-10-CM

## 2025-03-18 DIAGNOSIS — M79.89 SWELLING OF CLAVICULAR REGION: ICD-10-CM

## 2025-03-18 DIAGNOSIS — M25.362 KNEE INSTABILITY, LEFT: ICD-10-CM

## 2025-03-18 PROCEDURE — 6360000004 HC RX CONTRAST MEDICATION: Performed by: SURGERY

## 2025-03-18 PROCEDURE — 71260 CT THORAX DX C+: CPT

## 2025-03-18 PROCEDURE — 99204 OFFICE O/P NEW MOD 45 MIN: CPT

## 2025-03-18 RX ORDER — IOPAMIDOL 755 MG/ML
60 INJECTION, SOLUTION INTRAVASCULAR
Status: COMPLETED | OUTPATIENT
Start: 2025-03-18 | End: 2025-03-18

## 2025-03-18 RX ADMIN — IOPAMIDOL 60 ML: 755 INJECTION, SOLUTION INTRAVENOUS at 11:01

## 2025-03-26 ENCOUNTER — HOSPITAL ENCOUNTER (OUTPATIENT)
Dept: MRI IMAGING | Age: 50
Discharge: HOME | End: 2025-03-26
Payer: COMMERCIAL

## 2025-03-26 ENCOUNTER — TELEPHONE (OUTPATIENT)
Dept: SURGERY | Age: 50
End: 2025-03-26

## 2025-03-26 DIAGNOSIS — M25.362 KNEE INSTABILITY, LEFT: ICD-10-CM

## 2025-03-26 DIAGNOSIS — M25.562 ACUTE PAIN OF LEFT KNEE: ICD-10-CM

## 2025-03-26 DIAGNOSIS — Z02.6 ENCOUNTER RELATED TO WORKER'S COMPENSATION CLAIM: ICD-10-CM

## 2025-03-26 DIAGNOSIS — S83.002A SUBLUXATION OF LEFT PATELLA, INITIAL ENCOUNTER: ICD-10-CM

## 2025-03-26 PROCEDURE — 73721 MRI JNT OF LWR EXTRE W/O DYE: CPT

## 2025-03-27 NOTE — PROGRESS NOTES
Neg Hx        Review of Systems -   System Neg/Pos Details   Constitutional negative Fatigue and Fever.   Respiratory negative   Cough and Dyspnea.   Cardio negative   Chest pain.   GI negative   Abdominal pain and Vomiting.    negative   Urinary incontinence.   Neuro negative   Headache.   Psych negative   Psychiatric symptoms.       PHYSICAL EXAM:    There were no vitals taken for this visit.     GENERAL: No distress.  ORIENTATION: Alert and oriented to time, place, person.  MOOD AND AFFECT: Cooperative and pleasant.  GAIT EXAMINATION: Reciprocal heel-toe gait without assistive devices.  CARDIOVASCULAR: Symmetric 2 pulses in upper and lower extremity, no significant edema noted.  LYMPHATIC: No cervical or inguinal lymphadenopathy noted.  SENSATION: Grossly intact to light touch.  DEEP TENDON REFLEXES: Normal, no pathological reflexes.  COORDINATION/BALANCE: Normal.  Ortho Exam  [facility]  Looking at left knee, there is no obvious swelling, joint effusion, erythema, or palpable warmth.  Patient is tender upon palpation around left patella, medial joint line, and posterior aspect of the left knee.  Normal patella tracking noted to the left knee. She is able to ambulate without difficulty.  Positive Tasneem's.  Normal Lachman/anterior and posterior drawer test.  She is able to perform full arc of motion with the left knee.      Radiology:     MRI KNEE LEFT WO CONTRAST  Order: [id number]   Status: Final result       Next appt: 03/28/2025 at 01:00 PM in Orthopedic Surgery (Tracie Thomas, SHEYLA - CNP)       Dx: Encounter related to worker's compens...    Test Result Released: Yes (not seen)    0 Result Notes  Details    Reading Physician Reading Date Result Priority   Brigido Armendariz MD  [phone]     3/26/2025      Narrative & Impression  EXAM:  MRI LEFT KNEE WITHOUT CONTRAST     HISTORY:  Recent patellar dislocation with pain medially and posteriorly along the

## 2025-03-28 ENCOUNTER — OFFICE VISIT (OUTPATIENT)
Age: 50
End: 2025-03-28

## 2025-03-28 DIAGNOSIS — M25.562 ACUTE PAIN OF LEFT KNEE: ICD-10-CM

## 2025-03-28 DIAGNOSIS — Z02.6 ENCOUNTER RELATED TO WORKER'S COMPENSATION CLAIM: Primary | ICD-10-CM

## 2025-03-28 DIAGNOSIS — S83.8X2A ACUTE MEDIAL MENISCAL INJURY OF LEFT KNEE, INITIAL ENCOUNTER: ICD-10-CM

## 2025-05-14 ENCOUNTER — OFFICE VISIT (OUTPATIENT)
Age: 50
End: 2025-05-14

## 2025-05-14 VITALS — HEIGHT: 66 IN | WEIGHT: 108 LBS | BODY MASS INDEX: 17.36 KG/M2

## 2025-05-14 DIAGNOSIS — S83.8X2A ACUTE MEDIAL MENISCAL INJURY OF LEFT KNEE, INITIAL ENCOUNTER: ICD-10-CM

## 2025-05-14 DIAGNOSIS — M25.562 ACUTE PAIN OF LEFT KNEE: ICD-10-CM

## 2025-05-14 DIAGNOSIS — Z02.6 ENCOUNTER RELATED TO WORKER'S COMPENSATION CLAIM: ICD-10-CM

## 2025-05-14 DIAGNOSIS — M25.362 KNEE INSTABILITY, LEFT: Primary | ICD-10-CM

## 2025-05-14 NOTE — PROGRESS NOTES
Orthopaedic Clinic Note - Established Patient    NAME:  Beatrice Meier   : 1975  MRN: [id number]      2025      CHIEF COMPLAINT: Left knee pain      HISTORY OF PRESENT ILLNESS: Patient is a pleasant 49-year-old female that presents to clinic today for reevaluation of left knee injury.  Initial injury occurred on 3/15/2025 when patient was at work kneeling on the ground to roll up something after a show.  Patient reports she felt/heard a large  \"pop/ground \"to the left knee.  She believes her left patella dislocated but upon standing, patella return to place on its own.  Patient states since then she has been experiencing pain localized around the left patella, throughout the medial joint line, as well as posterior aspect of the left knee.  Due to continued pain, patient presented to [facility] emergency department on 3/16/2025 where x-ray imaging was obtained.  Patient was placed in a knee immobilizer and provided with crutches and instructed to reports our clinic for further evaluation.  Patient was seen in clinic on 3/18/2025 and a knee immobilizer with crutches.  She had been taking over-the-counter anti-inflammatories which have provided some relief in symptoms.  She was able to bear weight with the use of crutches.  Complained of knee instability and pain with weightbearing.  Patient was placed in a Shields brace and encouraged to weight-bear as tolerated.  Voltaren gel was prescribed to help with pain relief.  Following MRI review we discussed starting patient in outpatient physical therapy.     Today, she presents to the clinic for reevaluation.  She states that due to the wait time of over 4 weeks she did not complete physical therapy but was very diligent about doing home exercises.  She states that she has had significant improvement in symptoms and is only wearing the brace when she is doing large amount of activity.  She states that pain is minimal and no longer constant in nature.  She states that

## (undated) DEVICE — PACK,UNIVERSAL,NO GOWNS: Brand: MEDLINE

## (undated) DEVICE — RETRACTOR SURG WIDE FLAT LO PROF LTWT PHOTONGUIDE

## (undated) DEVICE — SUTURE PDS II SZ 2-0 L18IN ABSRB VLT SH L26MM 1/2 CIR TAPR Z775D

## (undated) DEVICE — DRESSING GRMCDL 6 12FR D1N CNTR HOLE 4MM ANTMCRBL PRTCTVE DI

## (undated) DEVICE — GLOVE SURG SZ 75 CRM LTX FREE POLYISOPRENE POLYMER BEAD ANTI

## (undated) DEVICE — PLASMABLADE PS210-030S 3.0S LOCK: Brand: PLASMABLADE™

## (undated) DEVICE — KIT PROC 1 ICG VI AQ SOLVNT DRP SPY ELITE

## (undated) DEVICE — DRAIN JACKSON PRATT ROUND 15FR: Brand: CARDINAL HEALTH

## (undated) DEVICE — JACKSON-PRATT 100CC BULB RESERVOIR: Brand: CARDINAL HEALTH

## (undated) DEVICE — TELFA NON-ADHERENT ABSORBENT DRESSING: Brand: TELFA

## (undated) DEVICE — FORCEPS BX 240CM 2.4MM L NDL RAD JAW 4 M00513334

## (undated) DEVICE — TOWEL,OR,DSP,ST,BLUE,DLX,4/PK,20PK/CS: Brand: MEDLINE

## (undated) DEVICE — Device

## (undated) DEVICE — ENDO KIT,LOURDES HOSPITAL: Brand: MEDLINE INDUSTRIES, INC.

## (undated) DEVICE — GAUZE,SPONGE,FLUFF,6"X6.75",STRL,10/TRAY: Brand: MEDLINE

## (undated) DEVICE — THREE QUARTER SHEET: Brand: CONVERTORS

## (undated) DEVICE — KIT NEG PRSS FOR NONLIN OR UPTO 90CM LIN INCIS PREVENA +

## (undated) DEVICE — BULB SYRINGE, IRRIGATION WITH PROTECTIVE CAP, 60 CC, INDIVIDUALLY WRAPPED: Brand: DOVER

## (undated) DEVICE — SUTURE PDS + SZ 2 0 L27IN ABSRB VLT L36MM CT 1 1 2 CIR PDP339H

## (undated) DEVICE — 3M™ IOBAN™ 2 ANTIMICROBIAL INCISE DRAPE 6650EZ: Brand: IOBAN™ 2

## (undated) DEVICE — SUTURE VCRL SZ 2-0 L36IN ABSRB UD L36MM CT-1 1/2 CIR J945H

## (undated) DEVICE — ASTOUND STANDARD SURGICAL GOWN, XL: Brand: CONVERTORS

## (undated) DEVICE — ASTOUND STANDARD SURGICAL GOWN, XXL: Brand: CONVERTORS

## (undated) DEVICE — CLIP INT M L GRN TI TRNSVRS GRV CHEVRON SHP W/ PRECIS TIP

## (undated) DEVICE — BLADE SURG NO10 C STL DISP ST

## (undated) DEVICE — SYSTEM IMPL DEL FOR BRST IMPL FUN (SEE COMMENT)

## (undated) DEVICE — GLOVE SURG SZ 85 L12IN FNGR THK94MIL TRNSLUC YEL LTX

## (undated) DEVICE — SPONGE LAP W18XL18IN WHT COT 4 PLY FLD STRUNG RADPQ DISP ST

## (undated) DEVICE — DISCONTINUED USE 127221 SUTURE SILK PRMHND ETHLN BR BLK REV 2-0 18 685H

## (undated) DEVICE — SUTURE ETHLN SZ 2-0 L30IN NONABSORBABLE BLK L36MM FSLX 3/8 1674H

## (undated) DEVICE — SUTURE MCRYL SZ 4-0 L18IN ABSRB UD L19MM PS-2 3/8 CIR PRIM Y496G

## (undated) DEVICE — 3M™ TEGADERM™ TRANSPARENT FILM DRESSING FRAME STYLE, 1628, 6 IN X 8 IN (15 CM X 20 CM), 10/CT 8CT/CASE: Brand: 3M™ TEGADERM™

## (undated) DEVICE — 3M™ WARMING BLANKET, LOWER BODY, 10 PER CASE, 42568: Brand: BAIR HUGGER™

## (undated) DEVICE — SEALER ENDOSCP NANO COAT OPN DIV CRV L JAW LIGASURE IMPACT

## (undated) DEVICE — SUTURE VCRL SZ 3-0 L36IN ABSRB UD L36MM CT-1 1/2 CIR J944H

## (undated) DEVICE — MAJOR CDS

## (undated) DEVICE — GLOVE SURG SZ 85 L12IN FNGR ORTHO 126MIL CRM LTX FREE